# Patient Record
Sex: MALE | Race: WHITE | NOT HISPANIC OR LATINO | Employment: FULL TIME | URBAN - NONMETROPOLITAN AREA
[De-identification: names, ages, dates, MRNs, and addresses within clinical notes are randomized per-mention and may not be internally consistent; named-entity substitution may affect disease eponyms.]

---

## 2021-06-20 ENCOUNTER — HOSPITAL ENCOUNTER (EMERGENCY)
Facility: HOSPITAL | Age: 61
Discharge: HOME | End: 2021-06-20
Attending: EMERGENCY MEDICINE
Payer: COMMERCIAL

## 2021-06-20 VITALS
RESPIRATION RATE: 18 BRPM | DIASTOLIC BLOOD PRESSURE: 75 MMHG | OXYGEN SATURATION: 98 % | TEMPERATURE: 99 F | HEIGHT: 72 IN | HEART RATE: 64 BPM | BODY MASS INDEX: 30.48 KG/M2 | SYSTOLIC BLOOD PRESSURE: 145 MMHG | WEIGHT: 225 LBS

## 2021-06-20 DIAGNOSIS — S30.0XXA CONTUSION OF LOWER BACK, INITIAL ENCOUNTER: ICD-10-CM
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
## 2021-06-20 PROCEDURE — 99283 EMERGENCY DEPT VISIT LOW MDM: CPT | Mod: 25

## 2021-06-20 PROCEDURE — 25000003 PHARM REV CODE 250: Performed by: EMERGENCY MEDICINE

## 2021-06-20 RX ORDER — PRAVASTATIN SODIUM 10 MG/1
10 TABLET ORAL DAILY
COMMUNITY
End: 2021-06-20

## 2021-06-20 RX ORDER — HYDROCODONE BITARTRATE AND ACETAMINOPHEN 5; 325 MG/1; MG/1
1 TABLET ORAL EVERY 6 HOURS PRN
Qty: 14 TABLET | Refills: 0 | Status: SHIPPED | OUTPATIENT
Start: 2021-06-20 | End: 2022-05-27

## 2021-06-20 RX ORDER — PANTOPRAZOLE SODIUM 20 MG/1
20 TABLET, DELAYED RELEASE ORAL DAILY
COMMUNITY
End: 2022-09-19

## 2021-06-20 RX ORDER — SIMVASTATIN 10 MG/1
10 TABLET, FILM COATED ORAL NIGHTLY
COMMUNITY
End: 2022-09-19

## 2021-06-20 RX ORDER — HYDROCODONE BITARTRATE AND ACETAMINOPHEN 10; 325 MG/1; MG/1
1 TABLET ORAL
Status: COMPLETED | OUTPATIENT
Start: 2021-06-20 | End: 2021-06-20

## 2021-06-20 RX ADMIN — HYDROCODONE BITARTRATE AND ACETAMINOPHEN 1 TABLET: 10; 325 TABLET ORAL at 09:06

## 2021-08-16 ENCOUNTER — APPOINTMENT (OUTPATIENT)
Dept: LAB | Facility: HOSPITAL | Age: 61
End: 2021-08-16
Attending: INTERNAL MEDICINE
Payer: COMMERCIAL

## 2021-08-16 DIAGNOSIS — Z20.828 CONTACT W AND EXPOSURE TO OTH VIRAL COMMUNICABLE DISEASES: ICD-10-CM

## 2021-08-16 DIAGNOSIS — R05.9 COUGH: ICD-10-CM

## 2021-08-16 LAB — SARS-COV-2 RNA RESP QL NAA+PROBE: NOT DETECTED

## 2021-08-16 PROCEDURE — U0002 COVID-19 LAB TEST NON-CDC: HCPCS | Performed by: INTERNAL MEDICINE

## 2022-05-27 PROBLEM — D01.0 CARCINOMA IN SITU OF COLON: Status: ACTIVE | Noted: 2022-05-27

## 2022-05-27 PROBLEM — F41.9 ANXIETY DISORDER: Status: ACTIVE | Noted: 2019-01-23

## 2022-05-27 PROBLEM — E78.5 DYSLIPIDEMIA: Status: ACTIVE | Noted: 2022-05-27

## 2022-05-27 PROBLEM — K22.70 BARRETT'S ESOPHAGUS: Status: ACTIVE | Noted: 2022-05-27

## 2022-05-27 PROBLEM — K57.90 DIVERTICULOSIS: Status: ACTIVE | Noted: 2022-05-27

## 2022-05-27 PROBLEM — E55.9 VITAMIN D DEFICIENCY: Status: ACTIVE | Noted: 2022-05-27

## 2022-05-27 PROBLEM — K44.9 HIATAL HERNIA: Status: ACTIVE | Noted: 2022-05-27

## 2022-05-27 PROBLEM — B02.9 SHINGLES: Status: ACTIVE | Noted: 2022-03-14

## 2022-05-27 PROBLEM — E78.5 HYPERLIPIDEMIA: Status: ACTIVE | Noted: 2022-05-27

## 2022-05-27 PROBLEM — G47.33 OSA (OBSTRUCTIVE SLEEP APNEA): Status: ACTIVE | Noted: 2021-03-11

## 2022-05-27 PROBLEM — N52.9 IMPOTENCE OF ORGANIC ORIGIN: Status: ACTIVE | Noted: 2020-08-25

## 2022-05-27 PROBLEM — K21.9 GERD (GASTROESOPHAGEAL REFLUX DISEASE): Status: ACTIVE | Noted: 2022-05-27

## 2022-09-19 PROBLEM — G47.33 OSA (OBSTRUCTIVE SLEEP APNEA): Status: RESOLVED | Noted: 2021-03-11 | Resolved: 2022-09-19

## 2022-09-22 ENCOUNTER — LAB VISIT (OUTPATIENT)
Dept: LAB | Facility: HOSPITAL | Age: 62
End: 2022-09-22
Attending: INTERNAL MEDICINE
Payer: COMMERCIAL

## 2022-09-22 DIAGNOSIS — Z12.5 PROSTATE CANCER SCREENING: ICD-10-CM

## 2022-09-22 DIAGNOSIS — E78.2 MIXED HYPERLIPIDEMIA: ICD-10-CM

## 2022-09-22 DIAGNOSIS — R53.83 MALAISE AND FATIGUE: ICD-10-CM

## 2022-09-22 DIAGNOSIS — Z11.4 SCREENING FOR HIV (HUMAN IMMUNODEFICIENCY VIRUS): ICD-10-CM

## 2022-09-22 DIAGNOSIS — R53.81 MALAISE AND FATIGUE: ICD-10-CM

## 2022-09-22 DIAGNOSIS — Z11.59 ENCOUNTER FOR HEPATITIS C SCREENING TEST FOR LOW RISK PATIENT: ICD-10-CM

## 2022-09-22 LAB
ALBUMIN SERPL BCP-MCNC: 3.7 G/DL (ref 3.5–5.2)
ALP SERPL-CCNC: 72 U/L (ref 55–135)
ALT SERPL W/O P-5'-P-CCNC: 20 U/L (ref 10–44)
ANION GAP SERPL CALC-SCNC: 4 MMOL/L (ref 8–16)
AST SERPL-CCNC: 10 U/L (ref 10–40)
BASOPHILS # BLD AUTO: 0.02 K/UL (ref 0–0.2)
BASOPHILS NFR BLD: 0.3 % (ref 0–1.9)
BILIRUB SERPL-MCNC: 0.6 MG/DL (ref 0.1–1)
BUN SERPL-MCNC: 14 MG/DL (ref 8–23)
CALCIUM SERPL-MCNC: 9.5 MG/DL (ref 8.7–10.5)
CHLORIDE SERPL-SCNC: 106 MMOL/L (ref 95–110)
CHOLEST SERPL-MCNC: 182 MG/DL (ref 120–199)
CHOLEST/HDLC SERPL: 5.2 {RATIO} (ref 2–5)
CO2 SERPL-SCNC: 30 MMOL/L (ref 23–29)
COMPLEXED PSA SERPL-MCNC: 1.6 NG/ML (ref 0–4)
CREAT SERPL-MCNC: 0.7 MG/DL (ref 0.5–1.4)
DIFFERENTIAL METHOD: ABNORMAL
EOSINOPHIL # BLD AUTO: 0.2 K/UL (ref 0–0.5)
EOSINOPHIL NFR BLD: 3.2 % (ref 0–8)
ERYTHROCYTE [DISTWIDTH] IN BLOOD BY AUTOMATED COUNT: 13 % (ref 11.5–14.5)
EST. GFR  (NO RACE VARIABLE): >60 ML/MIN/1.73 M^2
GLUCOSE SERPL-MCNC: 90 MG/DL (ref 70–110)
HCT VFR BLD AUTO: 47.9 % (ref 40–54)
HCV AB SERPL QL IA: NORMAL
HDLC SERPL-MCNC: 35 MG/DL (ref 40–75)
HDLC SERPL: 19.2 % (ref 20–50)
HGB BLD-MCNC: 16.4 G/DL (ref 14–18)
HIV 1+2 AB+HIV1 P24 AG SERPL QL IA: NORMAL
IMM GRANULOCYTES # BLD AUTO: 0.01 K/UL (ref 0–0.04)
IMM GRANULOCYTES NFR BLD AUTO: 0.2 % (ref 0–0.5)
LDLC SERPL CALC-MCNC: 115.4 MG/DL (ref 63–159)
LYMPHOCYTES # BLD AUTO: 1.8 K/UL (ref 1–4.8)
LYMPHOCYTES NFR BLD: 29.6 % (ref 18–48)
MCH RBC QN AUTO: 31.6 PG (ref 27–31)
MCHC RBC AUTO-ENTMCNC: 34.2 G/DL (ref 32–36)
MCV RBC AUTO: 92 FL (ref 82–98)
MONOCYTES # BLD AUTO: 0.5 K/UL (ref 0.3–1)
MONOCYTES NFR BLD: 8.1 % (ref 4–15)
NEUTROPHILS # BLD AUTO: 3.5 K/UL (ref 1.8–7.7)
NEUTROPHILS NFR BLD: 58.6 % (ref 38–73)
NONHDLC SERPL-MCNC: 147 MG/DL
NRBC BLD-RTO: 0 /100 WBC
PLATELET # BLD AUTO: 196 K/UL (ref 150–450)
PMV BLD AUTO: 11.6 FL (ref 9.2–12.9)
POTASSIUM SERPL-SCNC: 4.3 MMOL/L (ref 3.5–5.1)
PROT SERPL-MCNC: 7.3 G/DL (ref 6–8.4)
RBC # BLD AUTO: 5.19 M/UL (ref 4.6–6.2)
SODIUM SERPL-SCNC: 140 MMOL/L (ref 136–145)
TRIGL SERPL-MCNC: 158 MG/DL (ref 30–150)
TSH SERPL DL<=0.005 MIU/L-ACNC: 1.57 UIU/ML (ref 0.4–4)
WBC # BLD AUTO: 5.95 K/UL (ref 3.9–12.7)

## 2022-09-22 PROCEDURE — 84443 ASSAY THYROID STIM HORMONE: CPT | Performed by: INTERNAL MEDICINE

## 2022-09-22 PROCEDURE — 87389 HIV-1 AG W/HIV-1&-2 AB AG IA: CPT | Performed by: INTERNAL MEDICINE

## 2022-09-22 PROCEDURE — 84153 ASSAY OF PSA TOTAL: CPT | Performed by: INTERNAL MEDICINE

## 2022-09-22 PROCEDURE — 85025 COMPLETE CBC W/AUTO DIFF WBC: CPT | Performed by: INTERNAL MEDICINE

## 2022-09-22 PROCEDURE — 80061 LIPID PANEL: CPT | Performed by: INTERNAL MEDICINE

## 2022-09-22 PROCEDURE — 80053 COMPREHEN METABOLIC PANEL: CPT | Performed by: INTERNAL MEDICINE

## 2022-09-22 PROCEDURE — 86803 HEPATITIS C AB TEST: CPT | Performed by: INTERNAL MEDICINE

## 2022-09-22 PROCEDURE — 36415 COLL VENOUS BLD VENIPUNCTURE: CPT | Performed by: INTERNAL MEDICINE

## 2022-09-28 ENCOUNTER — HOSPITAL ENCOUNTER (OUTPATIENT)
Dept: RADIOLOGY | Facility: HOSPITAL | Age: 62
Discharge: HOME | End: 2022-09-28
Attending: INTERNAL MEDICINE
Payer: COMMERCIAL

## 2022-09-28 DIAGNOSIS — Z72.0 TOBACCO ABUSE: ICD-10-CM

## 2022-09-28 PROCEDURE — 71271 CT THORAX LUNG CANCER SCR C-: CPT | Mod: TC

## 2022-11-17 ENCOUNTER — HOSPITAL ENCOUNTER (OUTPATIENT)
Dept: RADIOLOGY | Facility: HOSPITAL | Age: 62
Discharge: HOME | End: 2022-11-17
Payer: COMMERCIAL

## 2022-11-17 DIAGNOSIS — J40 BRONCHITIS: ICD-10-CM

## 2022-11-17 PROCEDURE — 71046 X-RAY EXAM CHEST 2 VIEWS: CPT | Mod: TC

## 2022-11-17 NOTE — PROGRESS NOTES
Please let the patient know his xray did not show pneumonia.  Continue treatment as discussed in clinic.

## 2023-05-23 ENCOUNTER — PATIENT MESSAGE (OUTPATIENT)
Dept: RESEARCH | Facility: HOSPITAL | Age: 63
End: 2023-05-23
Payer: COMMERCIAL

## 2023-06-06 ENCOUNTER — HOSPITAL ENCOUNTER (OUTPATIENT)
Facility: HOSPITAL | Age: 63
Discharge: HOME | DRG: 390 | End: 2023-06-07
Attending: EMERGENCY MEDICINE | Admitting: EMERGENCY MEDICINE
Payer: COMMERCIAL

## 2023-06-06 ENCOUNTER — HOSPITAL ENCOUNTER (OUTPATIENT)
Dept: RADIOLOGY | Facility: HOSPITAL | Age: 63
Discharge: HOME | DRG: 390 | End: 2023-06-06
Payer: COMMERCIAL

## 2023-06-06 VITALS
OXYGEN SATURATION: 96 % | TEMPERATURE: 98 F | HEART RATE: 63 BPM | RESPIRATION RATE: 16 BRPM | SYSTOLIC BLOOD PRESSURE: 169 MMHG | DIASTOLIC BLOOD PRESSURE: 82 MMHG

## 2023-06-06 DIAGNOSIS — R10.84 GENERALIZED ABDOMINAL PAIN: ICD-10-CM

## 2023-06-06 DIAGNOSIS — R11.2 NAUSEA AND VOMITING, UNSPECIFIED VOMITING TYPE: ICD-10-CM

## 2023-06-06 DIAGNOSIS — K56.600 PARTIAL SMALL BOWEL OBSTRUCTION: Primary | ICD-10-CM

## 2023-06-06 PROBLEM — K56.609 SMALL BOWEL OBSTRUCTION: Status: ACTIVE | Noted: 2023-06-06

## 2023-06-06 LAB
ALBUMIN SERPL BCP-MCNC: 3.6 G/DL (ref 3.5–5.2)
ALP SERPL-CCNC: 76 U/L (ref 55–135)
ALT SERPL W/O P-5'-P-CCNC: 31 U/L (ref 10–44)
ANION GAP SERPL CALC-SCNC: 10 MMOL/L (ref 8–16)
AST SERPL-CCNC: 18 U/L (ref 10–40)
BASOPHILS # BLD AUTO: 0.01 K/UL (ref 0–0.2)
BASOPHILS NFR BLD: 0.1 % (ref 0–1.9)
BILIRUB SERPL-MCNC: 0.7 MG/DL (ref 0.1–1)
BUN SERPL-MCNC: 16 MG/DL (ref 8–23)
CALCIUM SERPL-MCNC: 8.7 MG/DL (ref 8.7–10.5)
CHLORIDE SERPL-SCNC: 105 MMOL/L (ref 95–110)
CO2 SERPL-SCNC: 25 MMOL/L (ref 23–29)
CREAT SERPL-MCNC: 0.7 MG/DL (ref 0.5–1.4)
DIFFERENTIAL METHOD: ABNORMAL
EOSINOPHIL # BLD AUTO: 0 K/UL (ref 0–0.5)
EOSINOPHIL NFR BLD: 0.3 % (ref 0–8)
ERYTHROCYTE [DISTWIDTH] IN BLOOD BY AUTOMATED COUNT: 12.7 % (ref 11.5–14.5)
EST. GFR  (NO RACE VARIABLE): >60 ML/MIN/1.73 M^2
GLUCOSE SERPL-MCNC: 125 MG/DL (ref 70–110)
HCT VFR BLD AUTO: 48.5 % (ref 40–54)
HGB BLD-MCNC: 17 G/DL (ref 14–18)
IMM GRANULOCYTES # BLD AUTO: 0.03 K/UL (ref 0–0.04)
IMM GRANULOCYTES NFR BLD AUTO: 0.3 % (ref 0–0.5)
LACTATE SERPL-SCNC: 0.7 MMOL/L (ref 0.5–2.2)
LIPASE SERPL-CCNC: 39 U/L (ref 23–300)
LYMPHOCYTES # BLD AUTO: 1 K/UL (ref 1–4.8)
LYMPHOCYTES NFR BLD: 11.3 % (ref 18–48)
MCH RBC QN AUTO: 32.2 PG (ref 27–31)
MCHC RBC AUTO-ENTMCNC: 35.1 G/DL (ref 32–36)
MCV RBC AUTO: 92 FL (ref 82–98)
MONOCYTES # BLD AUTO: 0.7 K/UL (ref 0.3–1)
MONOCYTES NFR BLD: 7.8 % (ref 4–15)
NEUTROPHILS # BLD AUTO: 7.1 K/UL (ref 1.8–7.7)
NEUTROPHILS NFR BLD: 80.2 % (ref 38–73)
NRBC BLD-RTO: 0 /100 WBC
PLATELET # BLD AUTO: 182 K/UL (ref 150–450)
PMV BLD AUTO: 10.6 FL (ref 9.2–12.9)
POTASSIUM SERPL-SCNC: 4.1 MMOL/L (ref 3.5–5.1)
PROT SERPL-MCNC: 7 G/DL (ref 6–8.4)
RBC # BLD AUTO: 5.28 M/UL (ref 4.6–6.2)
SODIUM SERPL-SCNC: 140 MMOL/L (ref 136–145)
WBC # BLD AUTO: 8.88 K/UL (ref 3.9–12.7)

## 2023-06-06 PROCEDURE — G0378 HOSPITAL OBSERVATION PER HR: HCPCS

## 2023-06-06 PROCEDURE — 25000003 PHARM REV CODE 250: Performed by: EMERGENCY MEDICINE

## 2023-06-06 PROCEDURE — 99285 EMERGENCY DEPT VISIT HI MDM: CPT | Mod: 25

## 2023-06-06 PROCEDURE — 96375 TX/PRO/DX INJ NEW DRUG ADDON: CPT

## 2023-06-06 PROCEDURE — 25000003 PHARM REV CODE 250

## 2023-06-06 PROCEDURE — 85025 COMPLETE CBC W/AUTO DIFF WBC: CPT | Mod: 91 | Performed by: EMERGENCY MEDICINE

## 2023-06-06 PROCEDURE — 83605 ASSAY OF LACTIC ACID: CPT | Performed by: EMERGENCY MEDICINE

## 2023-06-06 PROCEDURE — 63600175 PHARM REV CODE 636 W HCPCS: Performed by: EMERGENCY MEDICINE

## 2023-06-06 PROCEDURE — 74176 CT ABD & PELVIS W/O CONTRAST: CPT | Mod: TC

## 2023-06-06 PROCEDURE — 11000001 HC ACUTE MED/SURG PRIVATE ROOM

## 2023-06-06 PROCEDURE — 83690 ASSAY OF LIPASE: CPT | Mod: 91 | Performed by: EMERGENCY MEDICINE

## 2023-06-06 PROCEDURE — 80053 COMPREHEN METABOLIC PANEL: CPT | Mod: 91 | Performed by: EMERGENCY MEDICINE

## 2023-06-06 PROCEDURE — 96374 THER/PROPH/DIAG INJ IV PUSH: CPT

## 2023-06-06 PROCEDURE — 36415 COLL VENOUS BLD VENIPUNCTURE: CPT | Performed by: EMERGENCY MEDICINE

## 2023-06-06 PROCEDURE — 25000003 PHARM REV CODE 250: Performed by: INTERNAL MEDICINE

## 2023-06-06 RX ORDER — ONDANSETRON 2 MG/ML
4 INJECTION INTRAMUSCULAR; INTRAVENOUS EVERY 8 HOURS PRN
Status: DISCONTINUED | OUTPATIENT
Start: 2023-06-06 | End: 2023-06-07 | Stop reason: HOSPADM

## 2023-06-06 RX ORDER — HYDROMORPHONE HYDROCHLORIDE 1 MG/ML
1 INJECTION, SOLUTION INTRAMUSCULAR; INTRAVENOUS; SUBCUTANEOUS
Status: COMPLETED | OUTPATIENT
Start: 2023-06-06 | End: 2023-06-06

## 2023-06-06 RX ORDER — MAG HYDROX/ALUMINUM HYD/SIMETH 200-200-20
30 SUSPENSION, ORAL (FINAL DOSE FORM) ORAL EVERY 4 HOURS PRN
Status: DISCONTINUED | OUTPATIENT
Start: 2023-06-06 | End: 2023-06-07

## 2023-06-06 RX ORDER — OXYCODONE HYDROCHLORIDE 5 MG/1
5 TABLET ORAL EVERY 4 HOURS PRN
Status: DISCONTINUED | OUTPATIENT
Start: 2023-06-06 | End: 2023-06-07 | Stop reason: HOSPADM

## 2023-06-06 RX ORDER — FAMOTIDINE 10 MG/ML
20 INJECTION INTRAVENOUS EVERY 12 HOURS
Status: DISCONTINUED | OUTPATIENT
Start: 2023-06-06 | End: 2023-06-07 | Stop reason: HOSPADM

## 2023-06-06 RX ORDER — HYDROMORPHONE HYDROCHLORIDE 1 MG/ML
1 INJECTION, SOLUTION INTRAMUSCULAR; INTRAVENOUS; SUBCUTANEOUS EVERY 4 HOURS PRN
Status: DISCONTINUED | OUTPATIENT
Start: 2023-06-06 | End: 2023-06-07 | Stop reason: HOSPADM

## 2023-06-06 RX ORDER — SODIUM CHLORIDE 0.9 % (FLUSH) 0.9 %
10 SYRINGE (ML) INJECTION
Status: DISCONTINUED | OUTPATIENT
Start: 2023-06-06 | End: 2023-06-07 | Stop reason: HOSPADM

## 2023-06-06 RX ORDER — ATORVASTATIN CALCIUM 40 MG/1
40 TABLET, FILM COATED ORAL DAILY
Status: DISCONTINUED | OUTPATIENT
Start: 2023-06-07 | End: 2023-06-07 | Stop reason: HOSPADM

## 2023-06-06 RX ORDER — SODIUM CHLORIDE 9 MG/ML
INJECTION, SOLUTION INTRAVENOUS CONTINUOUS
Status: DISCONTINUED | OUTPATIENT
Start: 2023-06-06 | End: 2023-06-07 | Stop reason: HOSPADM

## 2023-06-06 RX ORDER — ONDANSETRON 2 MG/ML
8 INJECTION INTRAMUSCULAR; INTRAVENOUS
Status: COMPLETED | OUTPATIENT
Start: 2023-06-06 | End: 2023-06-06

## 2023-06-06 RX ORDER — ACETAMINOPHEN 325 MG/1
650 TABLET ORAL EVERY 8 HOURS PRN
Status: DISCONTINUED | OUTPATIENT
Start: 2023-06-06 | End: 2023-06-07 | Stop reason: HOSPADM

## 2023-06-06 RX ORDER — HYDRALAZINE HYDROCHLORIDE 20 MG/ML
5 INJECTION INTRAMUSCULAR; INTRAVENOUS EVERY 6 HOURS PRN
Status: DISCONTINUED | OUTPATIENT
Start: 2023-06-06 | End: 2023-06-07 | Stop reason: HOSPADM

## 2023-06-06 RX ADMIN — FAMOTIDINE 20 MG: 10 INJECTION INTRAVENOUS at 08:06

## 2023-06-06 RX ADMIN — HYDROMORPHONE HYDROCHLORIDE 1 MG: 1 INJECTION, SOLUTION INTRAMUSCULAR; INTRAVENOUS; SUBCUTANEOUS at 05:06

## 2023-06-06 RX ADMIN — HYDROMORPHONE HYDROCHLORIDE 1 MG: 1 INJECTION, SOLUTION INTRAMUSCULAR; INTRAVENOUS; SUBCUTANEOUS at 02:06

## 2023-06-06 RX ADMIN — ONDANSETRON HYDROCHLORIDE 8 MG: 2 SOLUTION INTRAMUSCULAR; INTRAVENOUS at 02:06

## 2023-06-06 RX ADMIN — SODIUM CHLORIDE: 9 INJECTION, SOLUTION INTRAVENOUS at 10:06

## 2023-06-06 RX ADMIN — SODIUM CHLORIDE 1000 ML: 9 INJECTION, SOLUTION INTRAVENOUS at 02:06

## 2023-06-06 RX ADMIN — SODIUM CHLORIDE: 9 INJECTION, SOLUTION INTRAVENOUS at 05:06

## 2023-06-06 RX ADMIN — HYDROMORPHONE HYDROCHLORIDE 1 MG: 1 INJECTION, SOLUTION INTRAMUSCULAR; INTRAVENOUS; SUBCUTANEOUS at 10:06

## 2023-06-06 RX ADMIN — ALUMINUM HYDROXIDE, MAGNESIUM HYDROXIDE, AND SIMETHICONE 30 ML: 200; 200; 20 SUSPENSION ORAL at 05:06

## 2023-06-06 NOTE — ED PROVIDER NOTES
Encounter Date: 6/6/2023       History     Chief Complaint   Patient presents with    Abdominal Pain     Referred to ED by [place]'s office for abdominal pain x 2 days with abnormal OP CT suggesting small bowel obstruction.      63-year-old male with a previous history of anxiety, diverticulosis, GERD presents the emergency department after being told by his we care physician that he had a small bowel obstruction.  Patient states with the past few days he has had a virus.  States his granddaughter brought it home from school, and the entire family was sick with nausea vomiting diarrhea.  He was having diarrhea as well.  His last bowel movement was this morning which was almost back to normal.  Having epigastric abdominal pain as well.  CT scan performed by primary care physician showed small-bowel obstruction with large lymph nodes concentrated in the right lower quadrant.  CT scan was a noncontrast CT.  Denies any blood in stool or vomit.  No chest pain or shortness of breath  No chest pain or shortness of  Review of patient's allergies indicates:  No Known Allergies  Past Medical History:   Diagnosis Date    Anxiety disorder     Isabel's esophagus     Diverticulosis     Dyslipidemia     Erectile dysfunction     GERD (gastroesophageal reflux disease)     Hiatal hernia     IFG (impaired fasting glucose)     Malaise and fatigue     GABRIEL (obstructive sleep apnea)     Shingles     Tobacco abuse     Vitamin D deficiency      Past Surgical History:   Procedure Laterality Date    APPENDECTOMY      HERNIA REPAIR       Family History   Problem Relation Age of Onset    Hypertension Mother     Aneurysm Mother     Hypertension Father     Diabetes Maternal Grandmother     Heart attack Maternal Grandmother     Aneurysm Maternal Uncle      Social History     Tobacco Use    Smoking status: Every Day     Packs/day: 1.00     Types: Cigarettes    Smokeless tobacco: Never   Substance Use Topics    Alcohol use: Not Currently    Drug use:  Never     Review of Systems   Constitutional:  Negative for fever.   HENT:  Negative for sore throat.    Respiratory:  Negative for shortness of breath.    Cardiovascular:  Negative for chest pain.   Gastrointestinal:  Positive for abdominal pain and nausea.   Genitourinary:  Negative for dysuria.   Musculoskeletal:  Negative for back pain.   Skin:  Negative for rash.   Neurological:  Negative for weakness.   Hematological:  Does not bruise/bleed easily.   All other systems reviewed and are negative.    Physical Exam     Initial Vitals [06/06/23 1331]   BP Pulse Resp Temp SpO2   (!) 169/82 76 18 98.3 °F (36.8 °C) 99 %      MAP       --         Physical Exam    Nursing note and vitals reviewed.  Constitutional: He appears well-developed and well-nourished. He is not diaphoretic. No distress.   HENT:   Head: Normocephalic and atraumatic.   Eyes: Conjunctivae and EOM are normal. Pupils are equal, round, and reactive to light. Right eye exhibits no discharge. Left eye exhibits no discharge. No scleral icterus.   Neck: Neck supple. No JVD present.   Normal range of motion.  Cardiovascular:  Normal rate, regular rhythm, normal heart sounds and intact distal pulses.           No murmur heard.  Pulmonary/Chest: Breath sounds normal. No stridor. No respiratory distress. He has no wheezes. He has no rhonchi. He has no rales. He exhibits no tenderness.   Abdominal: Abdomen is soft. He exhibits no distension and no mass. There is abdominal tenderness.   Tenderness in the upper abdomen frustrated in the epigastric region.  No rebound no guarding.  Bowel sounds slightly diminished, but present There is no rebound and no guarding.   Musculoskeletal:         General: No tenderness or edema. Normal range of motion.      Cervical back: Normal range of motion and neck supple.     Neurological: He is alert and oriented to person, place, and time. He has normal strength. GCS score is 15. GCS eye subscore is 4. GCS verbal subscore is 5.  GCS motor subscore is 6.   Skin: Skin is warm and dry. Capillary refill takes less than 2 seconds.       ED Course   Procedures  Labs Reviewed   CBC W/ AUTO DIFFERENTIAL - Abnormal; Notable for the following components:       Result Value    MCH 32.2 (*)     Gran % 80.2 (*)     Lymph % 11.3 (*)     All other components within normal limits   COMPREHENSIVE METABOLIC PANEL   LIPASE   LACTIC ACID, PLASMA          Imaging Results    None          Medications   sodium chloride 0.9% bolus 1,000 mL 1,000 mL (1,000 mLs Intravenous New Bag 6/6/23 1405)   ondansetron injection 8 mg (8 mg Intravenous Given 6/6/23 1405)   HYDROmorphone injection 1 mg (1 mg Intravenous Given 6/6/23 1405)     Medical Decision Making:   Differential Diagnosis:   Partial small-bowel obstruction, nausea vomiting, viral syndrome           ED Course as of 06/06/23 1415   Tue Jun 06, 2023   1356 No active vomiting.  Dr. Charlton in ER now to see patient, agrees to consult.  No NG tube needed at this time [SD]      ED Course User Index  [SD] Jefferson Rashid MD                 Clinical Impression:   Final diagnoses:  [K56.600] Partial small bowel obstruction (Primary)        ED Disposition Condition    Admit Stable                Jefferson Rashid MD  06/06/23 1412       Jefferson Rashid MD  06/06/23 1411

## 2023-06-07 VITALS
WEIGHT: 259.69 LBS | HEART RATE: 50 BPM | HEIGHT: 72 IN | SYSTOLIC BLOOD PRESSURE: 152 MMHG | DIASTOLIC BLOOD PRESSURE: 76 MMHG | TEMPERATURE: 98 F | RESPIRATION RATE: 20 BRPM | BODY MASS INDEX: 35.17 KG/M2 | OXYGEN SATURATION: 96 %

## 2023-06-07 PROBLEM — K52.9 GASTROENTERITIS: Status: ACTIVE | Noted: 2023-06-07

## 2023-06-07 PROBLEM — I15.8 OTHER SECONDARY HYPERTENSION: Status: ACTIVE | Noted: 2023-06-07

## 2023-06-07 PROBLEM — E86.0 DEHYDRATION: Status: ACTIVE | Noted: 2023-06-07

## 2023-06-07 LAB
ALBUMIN SERPL BCP-MCNC: 3.2 G/DL (ref 3.5–5.2)
ALP SERPL-CCNC: 59 U/L (ref 55–135)
ALT SERPL W/O P-5'-P-CCNC: 37 U/L (ref 10–44)
ANION GAP SERPL CALC-SCNC: 0 MMOL/L (ref 8–16)
AST SERPL-CCNC: 22 U/L (ref 10–40)
BASOPHILS # BLD AUTO: 0.02 K/UL (ref 0–0.2)
BASOPHILS NFR BLD: 0.3 % (ref 0–1.9)
BILIRUB SERPL-MCNC: 1 MG/DL (ref 0.1–1)
BUN SERPL-MCNC: 11 MG/DL (ref 8–23)
CALCIUM SERPL-MCNC: 8.3 MG/DL (ref 8.7–10.5)
CHLORIDE SERPL-SCNC: 110 MMOL/L (ref 95–110)
CO2 SERPL-SCNC: 29 MMOL/L (ref 23–29)
CREAT SERPL-MCNC: 0.7 MG/DL (ref 0.5–1.4)
DIFFERENTIAL METHOD: ABNORMAL
EOSINOPHIL # BLD AUTO: 0.2 K/UL (ref 0–0.5)
EOSINOPHIL NFR BLD: 2.7 % (ref 0–8)
ERYTHROCYTE [DISTWIDTH] IN BLOOD BY AUTOMATED COUNT: 12.6 % (ref 11.5–14.5)
EST. GFR  (NO RACE VARIABLE): >60 ML/MIN/1.73 M^2
GLUCOSE SERPL-MCNC: 100 MG/DL (ref 70–110)
HCT VFR BLD AUTO: 43.3 % (ref 40–54)
HGB BLD-MCNC: 14.7 G/DL (ref 14–18)
IMM GRANULOCYTES # BLD AUTO: 0.01 K/UL (ref 0–0.04)
IMM GRANULOCYTES NFR BLD AUTO: 0.2 % (ref 0–0.5)
LYMPHOCYTES # BLD AUTO: 1.6 K/UL (ref 1–4.8)
LYMPHOCYTES NFR BLD: 25.8 % (ref 18–48)
MCH RBC QN AUTO: 31.7 PG (ref 27–31)
MCHC RBC AUTO-ENTMCNC: 33.9 G/DL (ref 32–36)
MCV RBC AUTO: 93 FL (ref 82–98)
MONOCYTES # BLD AUTO: 1 K/UL (ref 0.3–1)
MONOCYTES NFR BLD: 16.3 % (ref 4–15)
NEUTROPHILS # BLD AUTO: 3.4 K/UL (ref 1.8–7.7)
NEUTROPHILS NFR BLD: 54.7 % (ref 38–73)
NRBC BLD-RTO: 0 /100 WBC
PLATELET # BLD AUTO: 157 K/UL (ref 150–450)
PMV BLD AUTO: 11.2 FL (ref 9.2–12.9)
POTASSIUM SERPL-SCNC: 3.8 MMOL/L (ref 3.5–5.1)
PROT SERPL-MCNC: 6.1 G/DL (ref 6–8.4)
RBC # BLD AUTO: 4.64 M/UL (ref 4.6–6.2)
SODIUM SERPL-SCNC: 139 MMOL/L (ref 136–145)
WBC # BLD AUTO: 6.25 K/UL (ref 3.9–12.7)

## 2023-06-07 PROCEDURE — 25000003 PHARM REV CODE 250

## 2023-06-07 PROCEDURE — 25000003 PHARM REV CODE 250: Performed by: EMERGENCY MEDICINE

## 2023-06-07 PROCEDURE — G0378 HOSPITAL OBSERVATION PER HR: HCPCS

## 2023-06-07 PROCEDURE — 85025 COMPLETE CBC W/AUTO DIFF WBC: CPT | Performed by: EMERGENCY MEDICINE

## 2023-06-07 PROCEDURE — 25000003 PHARM REV CODE 250: Performed by: INTERNAL MEDICINE

## 2023-06-07 PROCEDURE — 80053 COMPREHEN METABOLIC PANEL: CPT | Performed by: EMERGENCY MEDICINE

## 2023-06-07 PROCEDURE — 36415 COLL VENOUS BLD VENIPUNCTURE: CPT | Performed by: EMERGENCY MEDICINE

## 2023-06-07 RX ORDER — MAG HYDROX/ALUMINUM HYD/SIMETH 200-200-20
30 SUSPENSION, ORAL (FINAL DOSE FORM) ORAL EVERY 6 HOURS PRN
Qty: 354 ML | Refills: 0 | Status: SHIPPED | OUTPATIENT
Start: 2023-06-07 | End: 2024-06-06

## 2023-06-07 RX ORDER — MAG HYDROX/ALUMINUM HYD/SIMETH 200-200-20
30 SUSPENSION, ORAL (FINAL DOSE FORM) ORAL EVERY 6 HOURS PRN
Status: DISCONTINUED | OUTPATIENT
Start: 2023-06-07 | End: 2023-06-07 | Stop reason: HOSPADM

## 2023-06-07 RX ORDER — HYDROCODONE BITARTRATE AND ACETAMINOPHEN 10; 325 MG/1; MG/1
1 TABLET ORAL EVERY 8 HOURS PRN
Qty: 21 TABLET | Refills: 0 | Status: SHIPPED | OUTPATIENT
Start: 2023-06-07 | End: 2023-06-14

## 2023-06-07 RX ORDER — ONDANSETRON 4 MG/1
4 TABLET, ORALLY DISINTEGRATING ORAL EVERY 8 HOURS PRN
Qty: 20 TABLET | Refills: 0 | Status: SHIPPED | OUTPATIENT
Start: 2023-06-07 | End: 2024-03-12 | Stop reason: SDUPTHER

## 2023-06-07 RX ADMIN — ALUMINUM HYDROXIDE, MAGNESIUM HYDROXIDE, AND SIMETHICONE 30 ML: 200; 200; 20 SUSPENSION ORAL at 12:06

## 2023-06-07 RX ADMIN — SODIUM CHLORIDE: 9 INJECTION, SOLUTION INTRAVENOUS at 03:06

## 2023-06-07 NOTE — ASSESSMENT & PLAN NOTE
"06/07 CP: Generalized abdominal pain, low-grade fever, nausea, vomiting, and diarrhea. Not able to eat or drink much since then.  He reports diarrhea about 10-15 times each day.  He also reports bloating. Outpatient CT of abdomen/pelvis resulted "Numerous dilated small bowel loops in a pattern suggesting small bowel obstruction as above. Markedly enlarged lymph node within the right lower quadrant mesentery is nonspecific, but of most concern for a neoplastic process.  Reactive/inflammatory adenopathy is also in the differential." IV rehydration, liquid diet, IV PRN pain medication and nausea medication. General surgery consulted and on case.   "

## 2023-06-07 NOTE — CONSULTS
[facility] Surg  General Surgery  Consult Note    Patient Name: Ezio Porter  MRN: [id number]  Code Status: Full Code  Admission Date: 6/6/2023  Hospital Length of Stay: 1 days  Attending Physician: Al Fam III, MD  Primary Care Provider: Al Fam III, MD    Patient information was obtained from patient and ER records.     Inpatient consult to General Surgery  Consult performed by: Ester Charlton MD  Consult ordered by: Jefferson Rashid MD        Subjective:     Principal Problem: small bowel obstruction    History of Present Illness: 63-year-old male who presents with one-week history of nausea vomiting diarrhea.  Claims entire household with similar symptoms after granddaughter came home from some a cane.  Nausea and vomiting acutely worsened over the past 24 hours, prompting the patient to report to PCP where CT abdomen and pelvis without summer camp.  CT abdomen/pelvis without contrast demonstrated multiple dilated loops of small bowel suggestive of obstruction.  Last flatus earlier that afternoon.  Last bowel movement the previous evening.  CT scan also concerning for large mesenteric lymph nodes in the right lower quadrant with reactive versus neoplastic among differential.  General surgery consulted for evaluation.      No current facility-administered medications on file prior to encounter.     Current Outpatient Medications on File Prior to Encounter   Medication Sig    cholecalciferol, vitamin D3, 100 mcg (4,000 unit) Cap capsule 1 capsule.    multivitamin (THERAGRAN) per tablet Take 1 tablet by mouth once daily.    omega-3 acid ethyl esters (LOVAZA) 1 gram capsule Take 2 capsules (2 g total) by mouth 2 (two) times daily.    ondansetron (ZOFRAN-ODT) 4 MG TbDL Take 1 tablet (4 mg total) by mouth every 8 (eight) hours as needed (nausea/vomiting).    pantoprazole (PROTONIX) 40 MG tablet Take 1 tablet (40 mg total) by mouth once daily.    albuterol (PROVENTIL) 2.5 mg /3 mL  (0.083 %) nebulizer solution Take 3 mLs (2.5 mg total) by nebulization 3 (three) times daily. Rescue    calcium-vitamin D 600 mg-10 mcg (400 unit) Tab Take by mouth once.    nebulizer accessories Kit Inhale 1 kit into the lungs 3 (three) times daily.    nebulizer and compressor Candice 1 kit by Misc.(Non-Drug; Combo Route) route 3 (three) times daily.    simvastatin (ZOCOR) 20 MG tablet Take 1 tablet (20 mg total) by mouth once daily.       Review of patient's allergies indicates:  No Known Allergies    Past Medical History:   Diagnosis Date    Anxiety disorder     Isabel's esophagus     Diverticulosis     Dyslipidemia     Erectile dysfunction     GERD (gastroesophageal reflux disease)     Hiatal hernia     IFG (impaired fasting glucose)     Malaise and fatigue     GABRIEL (obstructive sleep apnea)     Shingles     Tobacco abuse     Vitamin D deficiency      Past Surgical History:   Procedure Laterality Date    APPENDECTOMY      HERNIA REPAIR       Family History       Problem Relation (Age of Onset)    Aneurysm Mother, Maternal Uncle    Diabetes Maternal Grandmother    Heart attack Maternal Grandmother    Hypertension Mother, Father          Tobacco Use    Smoking status: Every Day     Packs/day: 1.00     Types: Cigarettes    Smokeless tobacco: Never   Substance and Sexual Activity    Alcohol use: Not Currently    Drug use: Never    Sexual activity: Not Currently     Partners: Female     Review of Systems   Constitutional:  Negative for activity change, appetite change, chills and fever.   Respiratory:  Negative for chest tightness and shortness of breath.    Cardiovascular:  Negative for chest pain.   Gastrointestinal:  Positive for abdominal pain, diarrhea, nausea and vomiting. Negative for abdominal distention, anal bleeding, blood in stool, constipation and rectal pain.   Objective:     Vital Signs (Most Recent):  Temp: 97.6 °F (36.4 °C) (06/07/23 0712)  Pulse: (!) 50 (06/07/23 0712)  Resp: 20  (06/07/23 0712)  BP: (!) 152/76 (06/07/23 0712)  SpO2: 96 % (06/07/23 0712) Vital Signs (24h Range):  Temp:  [97.6 °F (36.4 °C)-98.6 °F (37 °C)] 97.6 °F (36.4 °C)  Pulse:  [50-76] 50  Resp:  [16-20] 20  SpO2:  [96 %-99 %] 96 %  BP: (136-186)/(72-98) 152/76     Weight: 117.8 kg (259 lb 11.2 oz)  Body mass index is 35.22 kg/m².     Physical Exam  Vitals reviewed.   Constitutional:       General: He is not in acute distress.     Appearance: He is not ill-appearing or toxic-appearing.   Cardiovascular:      Rate and Rhythm: Normal rate and regular rhythm.   Pulmonary:      Effort: Pulmonary effort is normal. No respiratory distress.   Abdominal:      General: There is no distension.      Palpations: Abdomen is soft.      Tenderness: There is abdominal tenderness (mild epigastric). There is no guarding or rebound.   Musculoskeletal:      Right lower leg: No edema.      Left lower leg: No edema.   Skin:     General: Skin is warm and dry.      Capillary Refill: Capillary refill takes less than 2 seconds.   Neurological:      Mental Status: He is alert. Mental status is at baseline.          I have reviewed all pertinent lab results within the past 24 hours.  CBC:   Recent Labs   Lab 06/07/23 0514   WBC 6.25   RBC 4.64   HGB 14.7   HCT 43.3      MCV 93   MCH 31.7*   MCHC 33.9     CMP:   Recent Labs   Lab 06/07/23 0514      CALCIUM 8.3*   ALBUMIN 3.2*   PROT 6.1      K 3.8   CO2 29      BUN 11   CREATININE 0.7   ALKPHOS 59   ALT 37   AST 22   BILITOT 1.0       Significant Diagnostics:  I have reviewed all pertinent imaging results/findings within the past 24 hours.      Assessment/Plan:     Small bowel obstruction  Patient with air and stool down to the rectum; small-bowel dilation possibly due to current enteritis and dehydration for   Admit to medicine for IV hydration and bowel rest   Will defer NGT at this time due to lack of nausea and vomiting at time examination   Recommend repeat CT  abdomen and pelvis with IV contrast once patient's symptoms have resolved to assess status of right lower quadrant lymph nodes and pursue further workup if necessary      VTE Risk Mitigation (From admission, onward)         Ordered     IP VTE HIGH RISK PATIENT  Once         06/06/23 1724     Place sequential compression device  Until discontinued         06/06/23 1724     Place TONYA hose  Until discontinued         06/06/23 1724                Thank you for your consult. I will sign off. Please contact us if you have any additional questions.    Ester Charlton MD  General Surgery  [facility] Surg

## 2023-06-07 NOTE — DISCHARGE SUMMARY
[facility] Medicine  Discharge Summary      Patient Name: Ezio Porter  MRN: [id number]  [facility]: [id number]  Patient Class: OP- Observation  Admission Date: 6/6/2023  Hospital Length of Stay: 1 days  Discharge Date and Time:  06/07/2023 9:41 AM  Attending Physician: Al Fam III, MD   Discharging Provider: ALY STRANGE  Primary Care Provider: Al Fam III, MD    Primary Care Team: Networked reference to record PCT     HPI:   ED HPI: 63-year-old male with a previous history of anxiety, diverticulosis, GERD presents the emergency department after being told by his we care physician that he had a small bowel obstruction.  Patient states with the past few days he has had a virus.  States his granddaughter brought it home from school, and the entire family was sick with nausea vomiting diarrhea.  He was having diarrhea as well.  His last bowel movement was this morning which was almost back to normal.  Having epigastric abdominal pain as well.  CT scan performed by primary care physician showed small-bowel obstruction with large lymph nodes concentrated in the right lower quadrant.  CT scan was a noncontrast CT.  Denies any blood in stool or vomit.  No chest pain or shortness of breath    IM HPI: Patient was seen by me in clinic yesterday. A 63-year-old  male presents by himself with complaints of generalized abdominal pain, low-grade fever, nausea, vomiting, and diarrhea onset 2 days ago.  Patient reports that his grandchildren were sick with similar symptoms and it spread to everyone in the household, but he is the only one with lingering symptoms and is not getting better.  He reports that he had nausea and vomiting on Sunday and has only vomited for that 1 occurrence.  He reports not being able to eat or drink much since then.  He reports diarrhea about 10-15 times each day.  He also reports bloating.  He denies blood in emesis or stool. Outpatient CT of  "abdomen/pelvis resulted "Numerous dilated small bowel loops in a pattern suggesting small bowel obstruction as above. Markedly enlarged lymph node within the right lower quadrant mesentery is nonspecific, but of most concern for a neoplastic process.  Reactive/inflammatory adenopathy is also in the differential." Patient was sent to the ED after consulting PCP Dr. Fam.       * No surgery found *      Hospital Course:   Discharge Summary:  Patient reports that he had a semi formed, soft bowel movement this morning.  Bowel sounds noted to auscultation in all 4 quadrants.  Reports he is tolerating clear liquid diet well with no vomiting.  Patient's PCP Dr. Fam recommends discharge today on liquid diet and slowly advance to low residue diet.  Discussed with Dr. Charlton, general surgery, and she agrees and recommends repeat CT of abdomen pelvis with IV contrast in a few weeks to assess lymph nodes. Patient will be discharged home today with diet recommendations, p.o. nausea medication, and p.o. pain medication.  Patient instructed to follow up in one-week at the clinic.  Patient also instructed to go back to ER if symptoms of nausea, vomiting, abdominal pain begin again or worsen.       Goals of Care Treatment Preferences:  Code Status: Full Code      Consults:   Consults (From admission, onward)        Status Ordering Provider     Inpatient consult to General Surgery  Once        Provider:  Ester Charlton MD    Completed DIONICIO SKINNER          Cardiac/Vascular  Other secondary hypertension  06/07 CP: Some hypertension noted during admission. IV PRN hydralazine ordered. Will continue to monitor.   BP Readings from Last 3 Encounters:   06/07/23 (!) 152/76   06/06/23 (!) 169/82   06/06/23 136/72     Monitor BP at home. Will recheck at clinic follow up in 1 week.    Dyslipidemia  Statin therapy.       Renal/  Dehydration  06/07 CP: Clear liquid diet, good po intake, and IV fluid rehydration. Dehydration s/t " "gastroenteritis with n/v/d and poor po intake.    Reports he is tolerating clear liquid diet well with no vomiting. Good IVF intake.    GI  * Small bowel obstruction  06/07 CP: Generalized abdominal pain, low-grade fever, nausea, vomiting, and diarrhea. Not able to eat or drink much since then.  He reports diarrhea about 10-15 times each day.  He also reports bloating. Outpatient CT of abdomen/pelvis resulted "Numerous dilated small bowel loops in a pattern suggesting small bowel obstruction as above. Markedly enlarged lymph node within the right lower quadrant mesentery is nonspecific, but of most concern for a neoplastic process.  Reactive/inflammatory adenopathy is also in the differential." IV rehydration, liquid diet, IV PRN pain medication and nausea medication. General surgery consulted and on case.     Patient reports that he had a semi formed, soft bowel movement this morning.  Bowel sounds noted to auscultation in all 4 quadrants.  Reports he is tolerating clear liquid diet well with no vomiting.  Patient's PCP Dr. Fam recommends discharge today on liquid diet and slowly advance to low residue diet.  Discussed with Dr. Charlton, general surgery, and she agrees and recommends repeat CT of abdomen pelvis with IV contrast in a few weeks to assess lymph nodes. Patient will be discharged home today with diet recommendations, p.o. nausea medication, and p.o. pain medication.       Gastroenteritis  06/07 CP:  Patient's household recently sick with nausea, vomiting, diarrhea. Suspected virus this started with grandchildren passed onto him. Small-bowel dilation possibly due to current enteritis and dehydration. He is the only one with lingering symptoms and is not getting better.  He reports that he had nausea and vomiting on Sunday and has only vomited for that 1 occurrence.  He reports not being able to eat or drink much since then.  He reports diarrhea about 10-15 times each day.  He also reports bloating. "     No nausea/vomiting/diarrhea since admission.    GERD (gastroesophageal reflux disease)  Pepcid ordered.  Continue home protonix    Diverticulosis  06/07 CP: No active diverticulitis on CT.      Other  Tobacco abuse  Tobacco cessation counseling given.        Final Active Diagnoses:    Diagnosis Date Noted POA    PRINCIPAL PROBLEM:  Small bowel obstruction [K56.609] 06/06/2023 Yes    Gastroenteritis [K52.9] 06/07/2023 Yes    Other secondary hypertension [I15.8] 06/07/2023 Yes    Dehydration [E86.0] 06/07/2023 Yes    Diverticulosis [K57.90] 05/27/2022 Yes    Dyslipidemia [E78.5] 05/27/2022 Yes    GERD (gastroesophageal reflux disease) [K21.9] 05/27/2022 Yes    Tobacco abuse [Z72.0] 12/18/2014 Yes      Problems Resolved During this Admission:       Discharged Condition: good    Disposition: Home or Self Care    Follow Up:   Follow-up Information     ALY STRANGE Follow up in 1 week(s).    Specialty: Family Medicine  Contact information:  [address] [place] [id number] [phone]                       Patient Instructions:      CT Abdomen Pelvis With Contrast   Standing Status: Future Standing Exp. Date: 06/07/24     Order Specific Question Answer Comments   Is the patient allergic to iodine contrast? No    Does this patient have impaired renal function? No    May the Radiologist modify the order per protocol to meet the clinical needs of the patient? Yes    Oral/Rectal Contrast instructions: NO Oral Contrast      Activity as tolerated       Significant Diagnostic Studies: Labs:   CMP   Recent Labs   Lab 06/06/23  1034 06/06/23  1357 06/07/23  0514    140 139   K 3.8 4.1 3.8    105 110   CO2 24 25 29   * 125* 100   BUN 14 16 11   CREATININE 0.7 0.7 0.7   CALCIUM 8.7 8.7 8.3*   PROT 7.4 7.0 6.1   ALBUMIN 3.8 3.6 3.2*   BILITOT 0.7 0.7 1.0   ALKPHOS 78 76 59   AST 13 18 22   ALT 36 31 37   ANIONGAP 12 10 0*   , CBC   Recent Labs   Lab 06/06/23  1034 06/06/23  1357  06/07/23  0514   WBC 9.43 8.88 6.25   HGB 17.7 17.0 14.7   HCT 50.9 48.5 43.3    182 157    and All labs within the past 24 hours have been reviewed  Microbiology: Blood Culture No results found for: LABBLOO  Radiology: X-Ray: CXR: X-Ray Chest 1 View (CXR): No results found for this visit on 06/06/23. and X-Ray Chest PA and Lateral (CXR): No results found for this visit on 06/06/23. and KUB: X-Ray Abdomen AP 1 View (KUB): No results found for this visit on 06/06/23.  CT scan: CT ABDOMEN PELVIS WITH CONTRAST: No results found for this visit on 06/06/23. and CT ABDOMEN PELVIS WITHOUT CONTRAST: No results found for this visit on 06/06/23.    Pending Diagnostic Studies:     None         Medications:  Reconciled Home Medications:      Medication List      START taking these medications    aluminum-magnesium hydroxide-simethicone 200-200-20 mg/5 mL Susp  Commonly known as: MAALOX  Take 30 mLs by mouth every 6 (six) hours as needed (heartburn/indigestion).     HYDROcodone-acetaminophen  mg per tablet  Commonly known as: NORCO  Take 1 tablet by mouth every 8 (eight) hours as needed for Pain.        CONTINUE taking these medications    albuterol 2.5 mg /3 mL (0.083 %) nebulizer solution  Commonly known as: PROVENTIL  Take 3 mLs (2.5 mg total) by nebulization 3 (three) times daily. Rescue     calcium-vitamin D 600 mg-10 mcg (400 unit) Tab  Take by mouth once.     cholecalciferol (vitamin D3) 100 mcg (4,000 unit) Cap capsule  1 capsule.     multivitamin per tablet  Commonly known as: THERAGRAN  Take 1 tablet by mouth once daily.     omega-3 acid ethyl esters 1 gram capsule  Commonly known as: LOVAZA  Take 2 capsules (2 g total) by mouth 2 (two) times daily.     ondansetron 4 MG Tbdl  Commonly known as: ZOFRAN-ODT  Take 1 tablet (4 mg total) by mouth every 8 (eight) hours as needed (nausea/vomiting).     pantoprazole 40 MG tablet  Commonly known as: PROTONIX  Take 1 tablet (40 mg total) by mouth once daily.      simvastatin 20 MG tablet  Commonly known as: ZOCOR  Take 1 tablet (20 mg total) by mouth once daily.        STOP taking these medications    nebulizer accessories Kit     nebulizer and compressor Candice            Indwelling Lines/Drains at time of discharge:   Lines/Drains/Airways     None                 Time spent on the discharge of patient: 30 minutes         ALY STRANGE  Department of Hospital Medicine  [facility] Surg

## 2023-06-07 NOTE — SUBJECTIVE & OBJECTIVE
Past Medical History:   Diagnosis Date    Anxiety disorder     Isabel's esophagus     Diverticulosis     Dyslipidemia     Erectile dysfunction     GERD (gastroesophageal reflux disease)     Hiatal hernia     IFG (impaired fasting glucose)     Malaise and fatigue     GABRIEL (obstructive sleep apnea)     Shingles     Tobacco abuse     Vitamin D deficiency        Past Surgical History:   Procedure Laterality Date    APPENDECTOMY      HERNIA REPAIR         Review of patient's allergies indicates:  No Known Allergies    No current facility-administered medications on file prior to encounter.     Current Outpatient Medications on File Prior to Encounter   Medication Sig    cholecalciferol, vitamin D3, 100 mcg (4,000 unit) Cap capsule 1 capsule.    multivitamin (THERAGRAN) per tablet Take 1 tablet by mouth once daily.    omega-3 acid ethyl esters (LOVAZA) 1 gram capsule Take 2 capsules (2 g total) by mouth 2 (two) times daily.    ondansetron (ZOFRAN-ODT) 4 MG TbDL Take 1 tablet (4 mg total) by mouth every 8 (eight) hours as needed (nausea/vomiting).    pantoprazole (PROTONIX) 40 MG tablet Take 1 tablet (40 mg total) by mouth once daily.    albuterol (PROVENTIL) 2.5 mg /3 mL (0.083 %) nebulizer solution Take 3 mLs (2.5 mg total) by nebulization 3 (three) times daily. Rescue    calcium-vitamin D 600 mg-10 mcg (400 unit) Tab Take by mouth once.    nebulizer accessories Kit Inhale 1 kit into the lungs 3 (three) times daily.    nebulizer and compressor Candice 1 kit by Misc.(Non-Drug; Combo Route) route 3 (three) times daily.    simvastatin (ZOCOR) 20 MG tablet Take 1 tablet (20 mg total) by mouth once daily.     Family History       Problem Relation (Age of Onset)    Aneurysm Mother, Maternal Uncle    Diabetes Maternal Grandmother    Heart attack Maternal Grandmother    Hypertension Mother, Father          Tobacco Use    Smoking status: Every Day     Packs/day: 1.00     Types: Cigarettes    Smokeless tobacco: Never   Substance and  Sexual Activity    Alcohol use: Not Currently    Drug use: Never    Sexual activity: Not Currently     Partners: Female     Review of Systems   Constitutional:  Positive for appetite change and fever. Negative for activity change, chills and fatigue.   HENT:  Negative for congestion, dental problem, drooling, ear pain, facial swelling, sore throat and trouble swallowing.    Eyes:  Negative for photophobia and visual disturbance.   Respiratory:  Negative for cough, choking, chest tightness, shortness of breath and wheezing.    Cardiovascular:  Negative for chest pain, palpitations and leg swelling.   Gastrointestinal:  Positive for abdominal distention, abdominal pain, diarrhea, nausea and vomiting. Negative for anal bleeding, blood in stool, constipation and rectal pain.   Endocrine: Negative for polyphagia.   Genitourinary:  Negative for decreased urine volume, difficulty urinating, dysuria, flank pain and frequency.   Musculoskeletal:  Negative for arthralgias, back pain, gait problem, joint swelling and myalgias.   Skin:  Negative for rash.   Allergic/Immunologic: Negative.    Neurological:  Positive for weakness and headaches. Negative for dizziness, tremors, seizures, syncope, facial asymmetry, speech difficulty, light-headedness and numbness.   Hematological:  Negative for adenopathy.   Psychiatric/Behavioral:  Negative for agitation, confusion and hallucinations. The patient is not nervous/anxious.    Objective:     Vital Signs (Most Recent):  Temp: 97.6 °F (36.4 °C) (06/07/23 0712)  Pulse: (!) 50 (06/07/23 0712)  Resp: 20 (06/07/23 0712)  BP: (!) 152/76 (06/07/23 0712)  SpO2: 96 % (06/07/23 0712) Vital Signs (24h Range):  Temp:  [97.6 °F (36.4 °C)-98.6 °F (37 °C)] 97.6 °F (36.4 °C)  Pulse:  [50-76] 50  Resp:  [16-20] 20  SpO2:  [96 %-99 %] 96 %  BP: (136-186)/(72-98) 152/76     Weight: 117.8 kg (259 lb 11.2 oz)  Body mass index is 35.22 kg/m².     Physical Exam  Vitals and nursing note reviewed.    Constitutional:       General: He is not in acute distress.     Appearance: Normal appearance. He is morbidly obese. He is not ill-appearing, toxic-appearing or diaphoretic.   HENT:      Head: Normocephalic and atraumatic.      Right Ear: External ear normal.      Left Ear: External ear normal.      Nose: Nose normal. No congestion.      Mouth/Throat:      Mouth: Mucous membranes are moist.   Eyes:      General: No scleral icterus.     Extraocular Movements: Extraocular movements intact.      Conjunctiva/sclera: Conjunctivae normal.      Pupils: Pupils are equal, round, and reactive to light.   Cardiovascular:      Rate and Rhythm: Regular rhythm. Bradycardia present.      Pulses: Normal pulses.      Heart sounds: Normal heart sounds. No murmur heard.    No friction rub. No gallop.   Pulmonary:      Effort: Pulmonary effort is normal. No respiratory distress.      Breath sounds: Normal breath sounds. No wheezing, rhonchi or rales.   Chest:      Chest wall: No tenderness.   Abdominal:      General: Bowel sounds are decreased. There is distension.      Palpations: There is no mass.      Tenderness: There is no abdominal tenderness. There is no guarding or rebound.      Hernia: No hernia is present.      Comments: firm   Musculoskeletal:         General: No swelling. Normal range of motion.      Cervical back: Normal range of motion and neck supple. No rigidity or tenderness.   Skin:     General: Skin is warm and dry.      Capillary Refill: Capillary refill takes less than 2 seconds.      Coloration: Skin is not jaundiced.      Findings: No rash.   Neurological:      General: No focal deficit present.      Mental Status: He is alert and oriented to person, place, and time. Mental status is at baseline.      Motor: No weakness.      Gait: Gait normal.   Psychiatric:         Mood and Affect: Mood normal.         Behavior: Behavior normal.         Thought Content: Thought content normal.         Judgment: Judgment  normal.            CRANIAL NERVES     CN III, IV, VI   Pupils are equal, round, and reactive to light.     Significant Labs: All pertinent labs within the past 24 hours have been reviewed.    Significant Imaging: I have reviewed all pertinent imaging results/findings within the past 24 hours.

## 2023-06-07 NOTE — ASSESSMENT & PLAN NOTE
Patient with air and stool down to the rectum; small-bowel dilation possibly due to current enteritis and dehydration for   Admit to medicine for IV hydration and bowel rest   Will defer NGT at this time due to lack of nausea and vomiting at time examination   Recommend repeat CT abdomen and pelvis with IV contrast once patient's symptoms have resolved to assess status of right lower quadrant lymph nodes and pursue further workup if necessary

## 2023-06-07 NOTE — DISCHARGE INSTRUCTIONS
Continue home medications as previously prescribed.  CT in 2 weeks.  Follow up in clinic in 1 week.  Follow a liquid diet, slowly advance to low residue diet as tolerated, then regular diet as tolerated.   Go to ER for nausea with vomiting, worsened abdominal pain.

## 2023-06-07 NOTE — ASSESSMENT & PLAN NOTE
06/07 CP:  Patient's household recently sick with nausea, vomiting, diarrhea. Suspected virus this started with grandchildren passed onto him. Small-bowel dilation possibly due to current enteritis and dehydration. He is the only one with lingering symptoms and is not getting better.  He reports that he had nausea and vomiting on Sunday and has only vomited for that 1 occurrence.  He reports not being able to eat or drink much since then.  He reports diarrhea about 10-15 times each day.  He also reports bloating.

## 2023-06-07 NOTE — ASSESSMENT & PLAN NOTE
06/07 CP:  Patient's household recently sick with nausea, vomiting, diarrhea. Suspected virus this started with grandchildren passed onto him. Small-bowel dilation possibly due to current enteritis and dehydration. He is the only one with lingering symptoms and is not getting better.  He reports that he had nausea and vomiting on Sunday and has only vomited for that 1 occurrence.  He reports not being able to eat or drink much since then.  He reports diarrhea about 10-15 times each day.  He also reports bloating.     No nausea/vomiting/diarrhea since admission.

## 2023-06-07 NOTE — ASSESSMENT & PLAN NOTE
06/07 CP: Clear liquid diet, good po intake, and IV fluid rehydration. Dehydration s/t gastroenteritis with n/v/d and poor po intake.

## 2023-06-07 NOTE — ASSESSMENT & PLAN NOTE
"06/07 CP: Generalized abdominal pain, low-grade fever, nausea, vomiting, and diarrhea. Not able to eat or drink much since then.  He reports diarrhea about 10-15 times each day.  He also reports bloating. Outpatient CT of abdomen/pelvis resulted "Numerous dilated small bowel loops in a pattern suggesting small bowel obstruction as above. Markedly enlarged lymph node within the right lower quadrant mesentery is nonspecific, but of most concern for a neoplastic process.  Reactive/inflammatory adenopathy is also in the differential." IV rehydration, liquid diet, IV PRN pain medication and nausea medication. General surgery consulted and on case.     Patient reports that he had a semi formed, soft bowel movement this morning.  Bowel sounds noted to auscultation in all 4 quadrants.  Reports he is tolerating clear liquid diet well with no vomiting.  Patient's PCP Dr. Fam recommends discharge today on liquid diet and slowly advance to low residue diet.  Discussed with Dr. Charlton, general surgery, and she agrees and recommends repeat CT of abdomen pelvis with IV contrast in a few weeks to assess lymph nodes. Patient will be discharged home today with diet recommendations, p.o. nausea medication, and p.o. pain medication.     "

## 2023-06-07 NOTE — ASSESSMENT & PLAN NOTE
06/07 CP: Some hypertension noted during admission. IV PRN hydralazine ordered. Will continue to monitor.   BP Readings from Last 3 Encounters:   06/07/23 (!) 152/76   06/06/23 (!) 169/82   06/06/23 136/72

## 2023-06-07 NOTE — PLAN OF CARE
Reached out to xray,  No answer.  Reached out per phone to Jeison in xray to schedule outpatient CT abdomen and pelvis in two weeks.  No answer.  Left message for Jeison to schedule patient and notify patient of time and date.

## 2023-06-07 NOTE — PLAN OF CARE
Discharge instructions reviewed with the patient. Peripheral IV removed and intact. Patient ambulated downstairs with staff to personal transportation.

## 2023-06-07 NOTE — ASSESSMENT & PLAN NOTE
06/07 CP: Some hypertension noted during admission. IV PRN hydralazine ordered. Will continue to monitor.   BP Readings from Last 3 Encounters:   06/07/23 (!) 152/76   06/06/23 (!) 169/82   06/06/23 136/72     Monitor BP at home. Will recheck at clinic follow up in 1 week.

## 2023-06-07 NOTE — HOSPITAL COURSE
Discharge Summary:  Patient reports that he had a semi formed, soft bowel movement this morning.  Bowel sounds noted to auscultation in all 4 quadrants.  Reports he is tolerating clear liquid diet well with no vomiting.  Patient's PCP Dr. Fam recommends discharge today on liquid diet and slowly advance to low residue diet.  Discussed with Dr. Charlton, general surgery, and she agrees and recommends repeat CT of abdomen pelvis with IV contrast in a few weeks to assess lymph nodes. Patient will be discharged home today with diet recommendations, p.o. nausea medication, and p.o. pain medication.  Patient instructed to follow up in one-week at the clinic.  Patient also instructed to go back to ER if symptoms of nausea, vomiting, abdominal pain begin again or worsen.

## 2023-06-07 NOTE — H&P
[facility] Medicine  History & Physical    Patient Name: Ezio Porter  MRN: [id number]  Patient Class: OP- Observation  Admission Date: 6/6/2023  Attending Physician: Al Fam III, MD   Primary Care Provider: Al Fam III, MD         Patient information was obtained from patient, past medical records, ER records and primary team.     Subjective:     Principal Problem:Small bowel obstruction    Chief Complaint:   Chief Complaint   Patient presents with    Abdominal Pain     Referred to ED by Cristel's office for abdominal pain x 2 days with abnormal OP CT suggesting small bowel obstruction.         HPI: ED HPI: 63-year-old male with a previous history of anxiety, diverticulosis, GERD presents the emergency department after being told by his we care physician that he had a small bowel obstruction.  Patient states with the past few days he has had a virus.  States his granddaughter brought it home from school, and the entire family was sick with nausea vomiting diarrhea.  He was having diarrhea as well.  His last bowel movement was this morning which was almost back to normal.  Having epigastric abdominal pain as well.  CT scan performed by primary care physician showed small-bowel obstruction with large lymph nodes concentrated in the right lower quadrant.  CT scan was a noncontrast CT.  Denies any blood in stool or vomit.  No chest pain or shortness of breath    IM HPI: Patient was seen by me in clinic yesterday. A 63-year-old  male presents by himself with complaints of generalized abdominal pain, low-grade fever, nausea, vomiting, and diarrhea onset 2 days ago.  Patient reports that his grandchildren were sick with similar symptoms and it spread to everyone in the household, but he is the only one with lingering symptoms and is not getting better.  He reports that he had nausea and vomiting on Sunday and has only vomited for that 1 occurrence.  He reports not being able to  "eat or drink much since then.  He reports diarrhea about 10-15 times each day.  He also reports bloating.  He denies blood in emesis or stool. Outpatient CT of abdomen/pelvis resulted "Numerous dilated small bowel loops in a pattern suggesting small bowel obstruction as above. Markedly enlarged lymph node within the right lower quadrant mesentery is nonspecific, but of most concern for a neoplastic process.  Reactive/inflammatory adenopathy is also in the differential." Patient was sent to the ED after consulting PCP Dr. Fam.       Past Medical History:   Diagnosis Date    Anxiety disorder     Isabel's esophagus     Diverticulosis     Dyslipidemia     Erectile dysfunction     GERD (gastroesophageal reflux disease)     Hiatal hernia     IFG (impaired fasting glucose)     Malaise and fatigue     GABRIEL (obstructive sleep apnea)     Shingles     Tobacco abuse     Vitamin D deficiency        Past Surgical History:   Procedure Laterality Date    APPENDECTOMY      HERNIA REPAIR         Review of patient's allergies indicates:  No Known Allergies    No current facility-administered medications on file prior to encounter.     Current Outpatient Medications on File Prior to Encounter   Medication Sig    cholecalciferol, vitamin D3, 100 mcg (4,000 unit) Cap capsule 1 capsule.    multivitamin (THERAGRAN) per tablet Take 1 tablet by mouth once daily.    omega-3 acid ethyl esters (LOVAZA) 1 gram capsule Take 2 capsules (2 g total) by mouth 2 (two) times daily.    ondansetron (ZOFRAN-ODT) 4 MG TbDL Take 1 tablet (4 mg total) by mouth every 8 (eight) hours as needed (nausea/vomiting).    pantoprazole (PROTONIX) 40 MG tablet Take 1 tablet (40 mg total) by mouth once daily.    albuterol (PROVENTIL) 2.5 mg /3 mL (0.083 %) nebulizer solution Take 3 mLs (2.5 mg total) by nebulization 3 (three) times daily. Rescue    calcium-vitamin D 600 mg-10 mcg (400 unit) Tab Take by mouth once.    nebulizer accessories Kit " Inhale 1 kit into the lungs 3 (three) times daily.    nebulizer and compressor Candice 1 kit by Misc.(Non-Drug; Combo Route) route 3 (three) times daily.    simvastatin (ZOCOR) 20 MG tablet Take 1 tablet (20 mg total) by mouth once daily.     Family History       Problem Relation (Age of Onset)    Aneurysm Mother, Maternal Uncle    Diabetes Maternal Grandmother    Heart attack Maternal Grandmother    Hypertension Mother, Father          Tobacco Use    Smoking status: Every Day     Packs/day: 1.00     Types: Cigarettes    Smokeless tobacco: Never   Substance and Sexual Activity    Alcohol use: Not Currently    Drug use: Never    Sexual activity: Not Currently     Partners: Female     Review of Systems   Constitutional:  Positive for appetite change and fever. Negative for activity change, chills and fatigue.   HENT:  Negative for congestion, dental problem, drooling, ear pain, facial swelling, sore throat and trouble swallowing.    Eyes:  Negative for photophobia and visual disturbance.   Respiratory:  Negative for cough, choking, chest tightness, shortness of breath and wheezing.    Cardiovascular:  Negative for chest pain, palpitations and leg swelling.   Gastrointestinal:  Positive for abdominal distention, abdominal pain, diarrhea, nausea and vomiting. Negative for anal bleeding, blood in stool, constipation and rectal pain.   Endocrine: Negative for polyphagia.   Genitourinary:  Negative for decreased urine volume, difficulty urinating, dysuria, flank pain and frequency.   Musculoskeletal:  Negative for arthralgias, back pain, gait problem, joint swelling and myalgias.   Skin:  Negative for rash.   Allergic/Immunologic: Negative.    Neurological:  Positive for weakness and headaches. Negative for dizziness, tremors, seizures, syncope, facial asymmetry, speech difficulty, light-headedness and numbness.   Hematological:  Negative for adenopathy.   Psychiatric/Behavioral:  Negative for agitation, confusion and  hallucinations. The patient is not nervous/anxious.    Objective:     Vital Signs (Most Recent):  Temp: 97.6 °F (36.4 °C) (06/07/23 0712)  Pulse: (!) 50 (06/07/23 0712)  Resp: 20 (06/07/23 0712)  BP: (!) 152/76 (06/07/23 0712)  SpO2: 96 % (06/07/23 0712) Vital Signs (24h Range):  Temp:  [97.6 °F (36.4 °C)-98.6 °F (37 °C)] 97.6 °F (36.4 °C)  Pulse:  [50-76] 50  Resp:  [16-20] 20  SpO2:  [96 %-99 %] 96 %  BP: (136-186)/(72-98) 152/76     Weight: 117.8 kg (259 lb 11.2 oz)  Body mass index is 35.22 kg/m².     Physical Exam  Vitals and nursing note reviewed.   Constitutional:       General: He is not in acute distress.     Appearance: Normal appearance. He is morbidly obese. He is not ill-appearing, toxic-appearing or diaphoretic.   HENT:      Head: Normocephalic and atraumatic.      Right Ear: External ear normal.      Left Ear: External ear normal.      Nose: Nose normal. No congestion.      Mouth/Throat:      Mouth: Mucous membranes are moist.   Eyes:      General: No scleral icterus.     Extraocular Movements: Extraocular movements intact.      Conjunctiva/sclera: Conjunctivae normal.      Pupils: Pupils are equal, round, and reactive to light.   Cardiovascular:      Rate and Rhythm: Regular rhythm. Bradycardia present.      Pulses: Normal pulses.      Heart sounds: Normal heart sounds. No murmur heard.    No friction rub. No gallop.   Pulmonary:      Effort: Pulmonary effort is normal. No respiratory distress.      Breath sounds: Normal breath sounds. No wheezing, rhonchi or rales.   Chest:      Chest wall: No tenderness.   Abdominal:      General: Bowel sounds are decreased. There is distension.      Palpations: There is no mass.      Tenderness: There is no abdominal tenderness. There is no guarding or rebound.      Hernia: No hernia is present.      Comments: firm   Musculoskeletal:         General: No swelling. Normal range of motion.      Cervical back: Normal range of motion and neck supple. No rigidity or  "tenderness.   Skin:     General: Skin is warm and dry.      Capillary Refill: Capillary refill takes less than 2 seconds.      Coloration: Skin is not jaundiced.      Findings: No rash.   Neurological:      General: No focal deficit present.      Mental Status: He is alert and oriented to person, place, and time. Mental status is at baseline.      Motor: No weakness.      Gait: Gait normal.   Psychiatric:         Mood and Affect: Mood normal.         Behavior: Behavior normal.         Thought Content: Thought content normal.         Judgment: Judgment normal.            CRANIAL NERVES     CN III, IV, VI   Pupils are equal, round, and reactive to light.     Significant Labs: All pertinent labs within the past 24 hours have been reviewed.    Significant Imaging: I have reviewed all pertinent imaging results/findings within the past 24 hours.    Assessment/Plan:     * Small bowel obstruction  06/07 CP: Generalized abdominal pain, low-grade fever, nausea, vomiting, and diarrhea. Not able to eat or drink much since then.  He reports diarrhea about 10-15 times each day.  He also reports bloating. Outpatient CT of abdomen/pelvis resulted "Numerous dilated small bowel loops in a pattern suggesting small bowel obstruction as above. Markedly enlarged lymph node within the right lower quadrant mesentery is nonspecific, but of most concern for a neoplastic process.  Reactive/inflammatory adenopathy is also in the differential." IV rehydration, liquid diet, IV PRN pain medication and nausea medication. General surgery consulted and on case.     Dehydration  06/07 CP: Clear liquid diet, good po intake, and IV fluid rehydration. Dehydration s/t gastroenteritis with n/v/d and poor po intake.      Other secondary hypertension  06/07 CP: Some hypertension noted during admission. IV PRN hydralazine ordered. Will continue to monitor.   BP Readings from Last 3 Encounters:   06/07/23 (!) 152/76   06/06/23 (!) 169/82   06/06/23 136/72 "         Gastroenteritis  06/07 CP:  Patient's household recently sick with nausea, vomiting, diarrhea. Suspected virus this started with grandchildren passed onto him. Small-bowel dilation possibly due to current enteritis and dehydration. He is the only one with lingering symptoms and is not getting better.  He reports that he had nausea and vomiting on Sunday and has only vomited for that 1 occurrence.  He reports not being able to eat or drink much since then.  He reports diarrhea about 10-15 times each day.  He also reports bloating.     Tobacco abuse  Tobacco cessation counseling given.      GERD (gastroesophageal reflux disease)  Pepcid ordered.      Dyslipidemia  Statin therapy.       Diverticulosis  06/07 CP: No active diverticulitis on CT.        VTE Risk Mitigation (From admission, onward)         Ordered     IP VTE HIGH RISK PATIENT  Once         06/06/23 1724     Place sequential compression device  Until discontinued         06/06/23 1724     Place TONYA hose  Until discontinued         06/06/23 1724                     On 06/07/2023, patient should be placed in hospital observation services under my care in collaboration with ALY Jerry III.  Department of Hospital Medicine  [facility]

## 2023-06-07 NOTE — HPI
"ED HPI: 63-year-old male with a previous history of anxiety, diverticulosis, GERD presents the emergency department after being told by his we care physician that he had a small bowel obstruction.  Patient states with the past few days he has had a virus.  States his granddaughter brought it home from school, and the entire family was sick with nausea vomiting diarrhea.  He was having diarrhea as well.  His last bowel movement was this morning which was almost back to normal.  Having epigastric abdominal pain as well.  CT scan performed by primary care physician showed small-bowel obstruction with large lymph nodes concentrated in the right lower quadrant.  CT scan was a noncontrast CT.  Denies any blood in stool or vomit.  No chest pain or shortness of breath    IM HPI: Patient was seen by me in clinic yesterday. A 63-year-old  male presents by himself with complaints of generalized abdominal pain, low-grade fever, nausea, vomiting, and diarrhea onset 2 days ago.  Patient reports that his grandchildren were sick with similar symptoms and it spread to everyone in the household, but he is the only one with lingering symptoms and is not getting better.  He reports that he had nausea and vomiting on Sunday and has only vomited for that 1 occurrence.  He reports not being able to eat or drink much since then.  He reports diarrhea about 10-15 times each day.  He also reports bloating.  He denies blood in emesis or stool. Outpatient CT of abdomen/pelvis resulted "Numerous dilated small bowel loops in a pattern suggesting small bowel obstruction as above. Markedly enlarged lymph node within the right lower quadrant mesentery is nonspecific, but of most concern for a neoplastic process.  Reactive/inflammatory adenopathy is also in the differential." Patient was sent to the ED after consulting PCP Dr. Fam.   "

## 2023-06-07 NOTE — PLAN OF CARE
Abdominal pain and indigestion continued throughout shift. Medications administered per MAR. Remains febrile. No diarrhea reported tonight.     Problem: Adult Inpatient Plan of Care  Goal: Plan of Care Review  Outcome: Ongoing, Progressing  Flowsheets (Taken 6/7/2023 0431)  Plan of Care Reviewed With: patient  Goal: Patient-Specific Goal (Individualized)  Outcome: Ongoing, Progressing  Goal: Absence of Hospital-Acquired Illness or Injury  Outcome: Ongoing, Progressing  Goal: Optimal Comfort and Wellbeing  Outcome: Ongoing, Progressing  Goal: Readiness for Transition of Care  Outcome: Ongoing, Progressing

## 2023-06-07 NOTE — HPI
63-year-old male who presents with one-week history of nausea vomiting diarrhea.  Claims entire household with similar symptoms after granddaughter came home from some a cane.  Nausea and vomiting acutely worsened over the past 24 hours, prompting the patient to report to PCP where CT abdomen and pelvis without summer camp.  CT abdomen/pelvis without contrast demonstrated multiple dilated loops of small bowel suggestive of obstruction.  Last flatus earlier that afternoon.  Last bowel movement the previous evening.  CT scan also concerning for large mesenteric lymph nodes in the right lower quadrant with reactive versus neoplastic among differential.  General surgery consulted for evaluation.

## 2023-06-07 NOTE — ASSESSMENT & PLAN NOTE
06/07 CP: Clear liquid diet, good po intake, and IV fluid rehydration. Dehydration s/t gastroenteritis with n/v/d and poor po intake.    Reports he is tolerating clear liquid diet well with no vomiting. Good IVF intake.

## 2023-06-07 NOTE — PLAN OF CARE
[facility]  Discharge Assessment    Primary Care Provider: Al Fam III, MD     Discharge Assessment (most recent)       BRIEF DISCHARGE ASSESSMENT - 06/07/23 0942          Discharge Planning    Assessment Type Discharge Planning Assessment     Resource/Environmental Concerns none     Support Systems Spouse/significant other     Equipment Currently Used at Home none     Current Living Arrangements home     Patient/Family Anticipates Transition to home;home with family     Patient/Family Anticipated Services at Transition none     DME Needed Upon Discharge  none     Discharge Plan A Home;Home with family     Discharge Plan B Home;Home with family                 Initial discharge assessment is completed. At this time, the patient does not require any assistance from case management. The patient is independent and, he still works. Attempted to offer the patient resources, but he politely declined. CM/SW will remain available.

## 2023-06-07 NOTE — SUBJECTIVE & OBJECTIVE
No current facility-administered medications on file prior to encounter.     Current Outpatient Medications on File Prior to Encounter   Medication Sig    cholecalciferol, vitamin D3, 100 mcg (4,000 unit) Cap capsule 1 capsule.    multivitamin (THERAGRAN) per tablet Take 1 tablet by mouth once daily.    omega-3 acid ethyl esters (LOVAZA) 1 gram capsule Take 2 capsules (2 g total) by mouth 2 (two) times daily.    ondansetron (ZOFRAN-ODT) 4 MG TbDL Take 1 tablet (4 mg total) by mouth every 8 (eight) hours as needed (nausea/vomiting).    pantoprazole (PROTONIX) 40 MG tablet Take 1 tablet (40 mg total) by mouth once daily.    albuterol (PROVENTIL) 2.5 mg /3 mL (0.083 %) nebulizer solution Take 3 mLs (2.5 mg total) by nebulization 3 (three) times daily. Rescue    calcium-vitamin D 600 mg-10 mcg (400 unit) Tab Take by mouth once.    nebulizer accessories Kit Inhale 1 kit into the lungs 3 (three) times daily.    nebulizer and compressor Candice 1 kit by Misc.(Non-Drug; Combo Route) route 3 (three) times daily.    simvastatin (ZOCOR) 20 MG tablet Take 1 tablet (20 mg total) by mouth once daily.       Review of patient's allergies indicates:  No Known Allergies    Past Medical History:   Diagnosis Date    Anxiety disorder     Isabel's esophagus     Diverticulosis     Dyslipidemia     Erectile dysfunction     GERD (gastroesophageal reflux disease)     Hiatal hernia     IFG (impaired fasting glucose)     Malaise and fatigue     GABRIEL (obstructive sleep apnea)     Shingles     Tobacco abuse     Vitamin D deficiency      Past Surgical History:   Procedure Laterality Date    APPENDECTOMY      HERNIA REPAIR       Family History       Problem Relation (Age of Onset)    Aneurysm Mother, Maternal Uncle    Diabetes Maternal Grandmother    Heart attack Maternal Grandmother    Hypertension Mother, Father          Tobacco Use    Smoking status: Every Day     Packs/day: 1.00     Types: Cigarettes    Smokeless tobacco: Never   Substance and  Sexual Activity    Alcohol use: Not Currently    Drug use: Never    Sexual activity: Not Currently     Partners: Female     Review of Systems   Constitutional:  Negative for activity change, appetite change, chills and fever.   Respiratory:  Negative for chest tightness and shortness of breath.    Cardiovascular:  Negative for chest pain.   Gastrointestinal:  Positive for abdominal pain, diarrhea, nausea and vomiting. Negative for abdominal distention, anal bleeding, blood in stool, constipation and rectal pain.   Objective:     Vital Signs (Most Recent):  Temp: 97.6 °F (36.4 °C) (06/07/23 0712)  Pulse: (!) 50 (06/07/23 0712)  Resp: 20 (06/07/23 0712)  BP: (!) 152/76 (06/07/23 0712)  SpO2: 96 % (06/07/23 0712) Vital Signs (24h Range):  Temp:  [97.6 °F (36.4 °C)-98.6 °F (37 °C)] 97.6 °F (36.4 °C)  Pulse:  [50-76] 50  Resp:  [16-20] 20  SpO2:  [96 %-99 %] 96 %  BP: (136-186)/(72-98) 152/76     Weight: 117.8 kg (259 lb 11.2 oz)  Body mass index is 35.22 kg/m².     Physical Exam  Vitals reviewed.   Constitutional:       General: He is not in acute distress.     Appearance: He is not ill-appearing or toxic-appearing.   Cardiovascular:      Rate and Rhythm: Normal rate and regular rhythm.   Pulmonary:      Effort: Pulmonary effort is normal. No respiratory distress.   Abdominal:      General: There is no distension.      Palpations: Abdomen is soft.      Tenderness: There is abdominal tenderness (mild epigastric). There is no guarding or rebound.   Musculoskeletal:      Right lower leg: No edema.      Left lower leg: No edema.   Skin:     General: Skin is warm and dry.      Capillary Refill: Capillary refill takes less than 2 seconds.   Neurological:      Mental Status: He is alert. Mental status is at baseline.          I have reviewed all pertinent lab results within the past 24 hours.  CBC:   Recent Labs   Lab 06/07/23  0514   WBC 6.25   RBC 4.64   HGB 14.7   HCT 43.3      MCV 93   MCH 31.7*   MCHC 33.9     CMP:    Recent Labs   Lab 06/07/23  0514      CALCIUM 8.3*   ALBUMIN 3.2*   PROT 6.1      K 3.8   CO2 29      BUN 11   CREATININE 0.7   ALKPHOS 59   ALT 37   AST 22   BILITOT 1.0       Significant Diagnostics:  I have reviewed all pertinent imaging results/findings within the past 24 hours.

## 2023-06-14 ENCOUNTER — LAB VISIT (OUTPATIENT)
Dept: LAB | Facility: HOSPITAL | Age: 63
End: 2023-06-14
Attending: INTERNAL MEDICINE
Payer: COMMERCIAL

## 2023-06-14 DIAGNOSIS — Z00.00 ROUTINE MEDICAL EXAM: ICD-10-CM

## 2023-06-14 DIAGNOSIS — K56.609 SMALL BOWEL OBSTRUCTION: ICD-10-CM

## 2023-06-14 DIAGNOSIS — R59.0 LOCALIZED ENLARGED LYMPH NODES: ICD-10-CM

## 2023-06-14 LAB
ALBUMIN SERPL BCP-MCNC: 3.6 G/DL (ref 3.5–5.2)
ALBUMIN SERPL BCP-MCNC: 3.6 G/DL (ref 3.5–5.2)
ALP SERPL-CCNC: 63 U/L (ref 55–135)
ALP SERPL-CCNC: 63 U/L (ref 55–135)
ALT SERPL W/O P-5'-P-CCNC: 30 U/L (ref 10–44)
ALT SERPL W/O P-5'-P-CCNC: 30 U/L (ref 10–44)
ANION GAP SERPL CALC-SCNC: 7 MMOL/L (ref 8–16)
ANION GAP SERPL CALC-SCNC: 7 MMOL/L (ref 8–16)
AST SERPL-CCNC: 9 U/L (ref 10–40)
AST SERPL-CCNC: 9 U/L (ref 10–40)
BASOPHILS # BLD AUTO: 0.03 K/UL (ref 0–0.2)
BASOPHILS # BLD AUTO: 0.03 K/UL (ref 0–0.2)
BASOPHILS NFR BLD: 0.4 % (ref 0–1.9)
BASOPHILS NFR BLD: 0.4 % (ref 0–1.9)
BILIRUB SERPL-MCNC: 0.3 MG/DL (ref 0.1–1)
BILIRUB SERPL-MCNC: 0.3 MG/DL (ref 0.1–1)
BUN SERPL-MCNC: 13 MG/DL (ref 8–23)
BUN SERPL-MCNC: 13 MG/DL (ref 8–23)
CALCIUM SERPL-MCNC: 9 MG/DL (ref 8.7–10.5)
CALCIUM SERPL-MCNC: 9 MG/DL (ref 8.7–10.5)
CHLORIDE SERPL-SCNC: 111 MMOL/L (ref 95–110)
CHLORIDE SERPL-SCNC: 111 MMOL/L (ref 95–110)
CO2 SERPL-SCNC: 24 MMOL/L (ref 23–29)
CO2 SERPL-SCNC: 24 MMOL/L (ref 23–29)
CREAT SERPL-MCNC: 0.8 MG/DL (ref 0.5–1.4)
CREAT SERPL-MCNC: 0.8 MG/DL (ref 0.5–1.4)
DIFFERENTIAL METHOD: ABNORMAL
DIFFERENTIAL METHOD: ABNORMAL
EOSINOPHIL # BLD AUTO: 0.2 K/UL (ref 0–0.5)
EOSINOPHIL # BLD AUTO: 0.2 K/UL (ref 0–0.5)
EOSINOPHIL NFR BLD: 2.5 % (ref 0–8)
EOSINOPHIL NFR BLD: 2.5 % (ref 0–8)
ERYTHROCYTE [DISTWIDTH] IN BLOOD BY AUTOMATED COUNT: 12.7 % (ref 11.5–14.5)
ERYTHROCYTE [DISTWIDTH] IN BLOOD BY AUTOMATED COUNT: 12.7 % (ref 11.5–14.5)
EST. GFR  (NO RACE VARIABLE): >60 ML/MIN/1.73 M^2
EST. GFR  (NO RACE VARIABLE): >60 ML/MIN/1.73 M^2
GLUCOSE SERPL-MCNC: 100 MG/DL (ref 70–110)
GLUCOSE SERPL-MCNC: 100 MG/DL (ref 70–110)
HCT VFR BLD AUTO: 44.9 % (ref 40–54)
HCT VFR BLD AUTO: 44.9 % (ref 40–54)
HGB BLD-MCNC: 15.5 G/DL (ref 14–18)
HGB BLD-MCNC: 15.5 G/DL (ref 14–18)
IMM GRANULOCYTES # BLD AUTO: 0.04 K/UL (ref 0–0.04)
IMM GRANULOCYTES # BLD AUTO: 0.04 K/UL (ref 0–0.04)
IMM GRANULOCYTES NFR BLD AUTO: 0.5 % (ref 0–0.5)
IMM GRANULOCYTES NFR BLD AUTO: 0.5 % (ref 0–0.5)
LYMPHOCYTES # BLD AUTO: 2.3 K/UL (ref 1–4.8)
LYMPHOCYTES # BLD AUTO: 2.3 K/UL (ref 1–4.8)
LYMPHOCYTES NFR BLD: 29.6 % (ref 18–48)
LYMPHOCYTES NFR BLD: 29.6 % (ref 18–48)
MAGNESIUM SERPL-MCNC: 1.9 MG/DL (ref 1.6–2.6)
MCH RBC QN AUTO: 32.3 PG (ref 27–31)
MCH RBC QN AUTO: 32.3 PG (ref 27–31)
MCHC RBC AUTO-ENTMCNC: 34.5 G/DL (ref 32–36)
MCHC RBC AUTO-ENTMCNC: 34.5 G/DL (ref 32–36)
MCV RBC AUTO: 94 FL (ref 82–98)
MCV RBC AUTO: 94 FL (ref 82–98)
MONOCYTES # BLD AUTO: 0.7 K/UL (ref 0.3–1)
MONOCYTES # BLD AUTO: 0.7 K/UL (ref 0.3–1)
MONOCYTES NFR BLD: 8.7 % (ref 4–15)
MONOCYTES NFR BLD: 8.7 % (ref 4–15)
NEUTROPHILS # BLD AUTO: 4.4 K/UL (ref 1.8–7.7)
NEUTROPHILS # BLD AUTO: 4.4 K/UL (ref 1.8–7.7)
NEUTROPHILS NFR BLD: 58.3 % (ref 38–73)
NEUTROPHILS NFR BLD: 58.3 % (ref 38–73)
NRBC BLD-RTO: 0 /100 WBC
NRBC BLD-RTO: 0 /100 WBC
PLATELET # BLD AUTO: 235 K/UL (ref 150–450)
PLATELET # BLD AUTO: 235 K/UL (ref 150–450)
PMV BLD AUTO: 11.5 FL (ref 9.2–12.9)
PMV BLD AUTO: 11.5 FL (ref 9.2–12.9)
POTASSIUM SERPL-SCNC: 4.1 MMOL/L (ref 3.5–5.1)
POTASSIUM SERPL-SCNC: 4.1 MMOL/L (ref 3.5–5.1)
PROT SERPL-MCNC: 6.9 G/DL (ref 6–8.4)
PROT SERPL-MCNC: 6.9 G/DL (ref 6–8.4)
RBC # BLD AUTO: 4.8 M/UL (ref 4.6–6.2)
RBC # BLD AUTO: 4.8 M/UL (ref 4.6–6.2)
SODIUM SERPL-SCNC: 142 MMOL/L (ref 136–145)
SODIUM SERPL-SCNC: 142 MMOL/L (ref 136–145)
WBC # BLD AUTO: 7.59 K/UL (ref 3.9–12.7)
WBC # BLD AUTO: 7.59 K/UL (ref 3.9–12.7)

## 2023-06-14 PROCEDURE — 36415 COLL VENOUS BLD VENIPUNCTURE: CPT | Performed by: INTERNAL MEDICINE

## 2023-06-14 PROCEDURE — 80053 COMPREHEN METABOLIC PANEL: CPT | Performed by: INTERNAL MEDICINE

## 2023-06-14 PROCEDURE — 83735 ASSAY OF MAGNESIUM: CPT

## 2023-06-14 PROCEDURE — 85025 COMPLETE CBC W/AUTO DIFF WBC: CPT | Performed by: INTERNAL MEDICINE

## 2023-08-26 ENCOUNTER — NURSE TRIAGE (OUTPATIENT)
Dept: ADMINISTRATIVE | Facility: CLINIC | Age: 63
End: 2023-08-26
Payer: COMMERCIAL

## 2023-08-26 NOTE — TELEPHONE ENCOUNTER
Pt calling about covid and quarantine and no answer x 2 left vm to call us back if any further needs          Reason for Disposition   Message left on unidentified voice mail.  Phone number verified.    Protocols used: No Contact or Duplicate Contact Call-A-AH

## 2023-10-04 ENCOUNTER — PATIENT MESSAGE (OUTPATIENT)
Dept: ADMINISTRATIVE | Facility: OTHER | Age: 63
End: 2023-10-04
Payer: COMMERCIAL

## 2023-11-22 PROBLEM — D3A.00 CARCINOID TUMOR: Status: ACTIVE | Noted: 2023-11-22

## 2024-03-27 ENCOUNTER — LAB VISIT (OUTPATIENT)
Dept: LAB | Facility: HOSPITAL | Age: 64
End: 2024-03-27
Attending: INTERNAL MEDICINE
Payer: COMMERCIAL

## 2024-03-27 DIAGNOSIS — E78.2 MIXED HYPERLIPIDEMIA: ICD-10-CM

## 2024-03-27 DIAGNOSIS — I15.8 OTHER SECONDARY HYPERTENSION: ICD-10-CM

## 2024-03-27 DIAGNOSIS — R53.81 MALAISE AND FATIGUE: ICD-10-CM

## 2024-03-27 DIAGNOSIS — Z00.00 ROUTINE GENERAL MEDICAL EXAMINATION AT A HEALTH CARE FACILITY: ICD-10-CM

## 2024-03-27 DIAGNOSIS — R53.83 MALAISE AND FATIGUE: ICD-10-CM

## 2024-03-27 DIAGNOSIS — E55.9 VITAMIN D DEFICIENCY: ICD-10-CM

## 2024-03-27 PROBLEM — E86.0 DEHYDRATION: Status: RESOLVED | Noted: 2023-06-07 | Resolved: 2024-03-27

## 2024-03-27 LAB
25(OH)D3+25(OH)D2 SERPL-MCNC: 40 NG/ML (ref 30–96)
ALBUMIN SERPL BCP-MCNC: 3.8 G/DL (ref 3.5–5.2)
ALP SERPL-CCNC: 84 U/L (ref 55–135)
ALT SERPL W/O P-5'-P-CCNC: 28 U/L (ref 10–44)
ANION GAP SERPL CALC-SCNC: 6 MMOL/L (ref 3–11)
AST SERPL-CCNC: 14 U/L (ref 10–40)
BASOPHILS # BLD AUTO: 0.03 K/UL (ref 0–0.2)
BASOPHILS NFR BLD: 0.5 % (ref 0–1.9)
BILIRUB SERPL-MCNC: 0.5 MG/DL (ref 0.1–1)
BUN SERPL-MCNC: 14 MG/DL (ref 8–23)
CALCIUM SERPL-MCNC: 8.9 MG/DL (ref 8.7–10.5)
CHLORIDE SERPL-SCNC: 108 MMOL/L (ref 95–110)
CHOLEST SERPL-MCNC: 161 MG/DL (ref 120–199)
CHOLEST/HDLC SERPL: 4.5 {RATIO} (ref 2–5)
CO2 SERPL-SCNC: 26 MMOL/L (ref 23–29)
CREAT SERPL-MCNC: 0.9 MG/DL (ref 0.5–1.4)
DIFFERENTIAL METHOD BLD: ABNORMAL
EOSINOPHIL # BLD AUTO: 0.2 K/UL (ref 0–0.5)
EOSINOPHIL NFR BLD: 2.3 % (ref 0–8)
ERYTHROCYTE [DISTWIDTH] IN BLOOD BY AUTOMATED COUNT: 13.3 % (ref 11.5–14.5)
EST. GFR  (NO RACE VARIABLE): >60 ML/MIN/1.73 M^2
GLUCOSE SERPL-MCNC: 92 MG/DL (ref 70–110)
HCT VFR BLD AUTO: 47.2 % (ref 40–54)
HDLC SERPL-MCNC: 36 MG/DL (ref 40–75)
HDLC SERPL: 22.4 % (ref 20–50)
HGB BLD-MCNC: 16.1 G/DL (ref 14–18)
IMM GRANULOCYTES # BLD AUTO: 0.02 K/UL (ref 0–0.04)
IMM GRANULOCYTES NFR BLD AUTO: 0.3 % (ref 0–0.5)
LDLC SERPL CALC-MCNC: 96.4 MG/DL (ref 63–159)
LYMPHOCYTES # BLD AUTO: 2.1 K/UL (ref 1–4.8)
LYMPHOCYTES NFR BLD: 32.4 % (ref 18–48)
MCH RBC QN AUTO: 32 PG (ref 27–31)
MCHC RBC AUTO-ENTMCNC: 34.1 G/DL (ref 32–36)
MCV RBC AUTO: 94 FL (ref 82–98)
MONOCYTES # BLD AUTO: 0.6 K/UL (ref 0.3–1)
MONOCYTES NFR BLD: 9 % (ref 4–15)
NEUTROPHILS # BLD AUTO: 3.7 K/UL (ref 1.8–7.7)
NEUTROPHILS NFR BLD: 55.5 % (ref 38–73)
NONHDLC SERPL-MCNC: 125 MG/DL
NRBC BLD-RTO: 0 /100 WBC
PLATELET # BLD AUTO: 213 K/UL (ref 150–450)
PMV BLD AUTO: 10.7 FL (ref 9.2–12.9)
POTASSIUM SERPL-SCNC: 4.2 MMOL/L (ref 3.5–5.1)
PROT SERPL-MCNC: 7.3 G/DL (ref 6–8.4)
RBC # BLD AUTO: 5.03 M/UL (ref 4.6–6.2)
SODIUM SERPL-SCNC: 140 MMOL/L (ref 136–145)
T4 FREE SERPL-MCNC: 0.85 NG/DL (ref 0.71–1.51)
TRIGL SERPL-MCNC: 143 MG/DL (ref 30–150)
TSH SERPL DL<=0.005 MIU/L-ACNC: 2.01 UIU/ML (ref 0.4–4)
WBC # BLD AUTO: 6.57 K/UL (ref 3.9–12.7)

## 2024-03-27 PROCEDURE — 84443 ASSAY THYROID STIM HORMONE: CPT | Performed by: INTERNAL MEDICINE

## 2024-03-27 PROCEDURE — 84439 ASSAY OF FREE THYROXINE: CPT | Performed by: INTERNAL MEDICINE

## 2024-03-27 PROCEDURE — 36415 COLL VENOUS BLD VENIPUNCTURE: CPT | Performed by: INTERNAL MEDICINE

## 2024-03-27 PROCEDURE — 82306 VITAMIN D 25 HYDROXY: CPT | Performed by: INTERNAL MEDICINE

## 2024-03-27 PROCEDURE — 85025 COMPLETE CBC W/AUTO DIFF WBC: CPT | Performed by: INTERNAL MEDICINE

## 2024-03-27 PROCEDURE — 80053 COMPREHEN METABOLIC PANEL: CPT | Performed by: INTERNAL MEDICINE

## 2024-03-27 PROCEDURE — 80061 LIPID PANEL: CPT | Performed by: INTERNAL MEDICINE

## 2024-04-01 ENCOUNTER — HOSPITAL ENCOUNTER (OUTPATIENT)
Dept: RADIOLOGY | Facility: HOSPITAL | Age: 64
Discharge: HOME | End: 2024-04-01
Attending: INTERNAL MEDICINE
Payer: COMMERCIAL

## 2024-04-01 DIAGNOSIS — F17.210 NICOTINE DEPENDENCE, CIGARETTES, UNCOMPLICATED: ICD-10-CM

## 2024-04-01 DIAGNOSIS — Z72.0 TOBACCO ABUSE: ICD-10-CM

## 2024-04-01 PROCEDURE — 71271 CT THORAX LUNG CANCER SCR C-: CPT | Mod: TC

## 2024-04-02 NOTE — PROGRESS NOTES
Notify patient that his CT scan was abnormal there was 1 area along the right lung that needs consultation with Pulmonary.  Please refer to Pulmonary and bring copies of CT with him and the report.

## 2024-10-21 ENCOUNTER — PATIENT MESSAGE (OUTPATIENT)
Dept: FAMILY MEDICINE | Facility: CLINIC | Age: 64
End: 2024-10-21
Payer: COMMERCIAL

## 2025-05-14 ENCOUNTER — LAB VISIT (OUTPATIENT)
Dept: LAB | Facility: HOSPITAL | Age: 65
End: 2025-05-14
Attending: INTERNAL MEDICINE
Payer: MEDICARE

## 2025-05-14 DIAGNOSIS — D3A.00 CARCINOID TUMOR, UNSPECIFIED SITE, UNSPECIFIED WHETHER MALIGNANT: ICD-10-CM

## 2025-05-14 DIAGNOSIS — R73.01 IFG (IMPAIRED FASTING GLUCOSE): ICD-10-CM

## 2025-05-14 DIAGNOSIS — Z12.5 PROSTATE CANCER SCREENING: ICD-10-CM

## 2025-05-14 LAB
25(OH)D3+25(OH)D2 SERPL-MCNC: 53 NG/ML (ref 30–96)
ABSOLUTE EOSINOPHIL (OHS): 0.19 K/UL
ABSOLUTE MONOCYTE (OHS): 0.41 K/UL (ref 0.3–1)
ABSOLUTE NEUTROPHIL COUNT (OHS): 3.33 K/UL (ref 1.8–7.7)
ALBUMIN SERPL BCP-MCNC: 4.2 G/DL (ref 3.5–5.2)
ALP SERPL-CCNC: 78 UNIT/L (ref 40–150)
ALT SERPL W/O P-5'-P-CCNC: 17 UNIT/L (ref 10–44)
ANION GAP (OHS): 8 MMOL/L (ref 8–16)
AST SERPL-CCNC: 16 UNIT/L (ref 11–45)
BASOPHILS # BLD AUTO: 0.04 K/UL
BASOPHILS NFR BLD AUTO: 0.7 %
BILIRUB SERPL-MCNC: 0.7 MG/DL (ref 0.1–1)
BUN SERPL-MCNC: 13 MG/DL (ref 8–23)
CALCIUM SERPL-MCNC: 9.2 MG/DL (ref 8.7–10.5)
CHLORIDE SERPL-SCNC: 106 MMOL/L (ref 95–110)
CHOLEST SERPL-MCNC: 157 MG/DL (ref 120–199)
CHOLEST/HDLC SERPL: 3.9 {RATIO} (ref 2–5)
CO2 SERPL-SCNC: 28 MMOL/L (ref 23–29)
CREAT SERPL-MCNC: 0.8 MG/DL (ref 0.5–1.4)
ERYTHROCYTE [DISTWIDTH] IN BLOOD BY AUTOMATED COUNT: 13 % (ref 11.5–14.5)
GFR SERPLBLD CREATININE-BSD FMLA CKD-EPI: >60 ML/MIN/1.73/M2
GLUCOSE SERPL-MCNC: 86 MG/DL (ref 70–110)
HCT VFR BLD AUTO: 48.2 % (ref 40–54)
HDLC SERPL-MCNC: 40 MG/DL (ref 40–75)
HDLC SERPL: 25.5 % (ref 20–50)
HGB BLD-MCNC: 16.3 GM/DL (ref 14–18)
IMM GRANULOCYTES # BLD AUTO: 0.01 K/UL (ref 0–0.04)
IMM GRANULOCYTES NFR BLD AUTO: 0.2 % (ref 0–0.5)
LDLC SERPL CALC-MCNC: 89.4 MG/DL (ref 63–159)
LYMPHOCYTES # BLD AUTO: 2.16 K/UL (ref 1–4.8)
MCH RBC QN AUTO: 32.3 PG (ref 27–31)
MCHC RBC AUTO-ENTMCNC: 33.8 G/DL (ref 32–36)
MCV RBC AUTO: 95 FL (ref 82–98)
NONHDLC SERPL-MCNC: 117 MG/DL
NUCLEATED RBC (/100WBC) (OHS): 0 /100 WBC
PLATELET # BLD AUTO: 211 K/UL (ref 150–450)
PMV BLD AUTO: 10.7 FL (ref 9.2–12.9)
POTASSIUM SERPL-SCNC: 4.5 MMOL/L (ref 3.5–5.1)
PROT SERPL-MCNC: 7 GM/DL (ref 6–8.4)
PSA SERPL-MCNC: 3.09 NG/ML
RBC # BLD AUTO: 5.05 M/UL (ref 4.6–6.2)
RELATIVE EOSINOPHIL (OHS): 3.1 %
RELATIVE LYMPHOCYTE (OHS): 35.2 % (ref 18–48)
RELATIVE MONOCYTE (OHS): 6.7 % (ref 4–15)
RELATIVE NEUTROPHIL (OHS): 54.1 % (ref 38–73)
SODIUM SERPL-SCNC: 142 MMOL/L (ref 136–145)
TRIGL SERPL-MCNC: 138 MG/DL (ref 30–150)
TSH SERPL-ACNC: 1.61 UIU/ML (ref 0.4–4)
WBC # BLD AUTO: 6.14 K/UL (ref 3.9–12.7)

## 2025-05-14 PROCEDURE — 84153 ASSAY OF PSA TOTAL: CPT

## 2025-05-14 PROCEDURE — 84443 ASSAY THYROID STIM HORMONE: CPT

## 2025-05-14 PROCEDURE — 80061 LIPID PANEL: CPT

## 2025-05-14 PROCEDURE — 85025 COMPLETE CBC W/AUTO DIFF WBC: CPT

## 2025-05-14 PROCEDURE — 36415 COLL VENOUS BLD VENIPUNCTURE: CPT

## 2025-05-14 PROCEDURE — 80053 COMPREHEN METABOLIC PANEL: CPT

## 2025-05-14 PROCEDURE — 82306 VITAMIN D 25 HYDROXY: CPT
